# Patient Record
Sex: FEMALE | Race: WHITE | Employment: FULL TIME | URBAN - METROPOLITAN AREA
[De-identification: names, ages, dates, MRNs, and addresses within clinical notes are randomized per-mention and may not be internally consistent; named-entity substitution may affect disease eponyms.]

---

## 2018-04-28 LAB
CHOLESTEROL, TOTAL: 240 MG/DL
CHOLESTEROL/HDL RATIO: 2.9
CREATININE: 1 MG/DL
HDLC SERPL-MCNC: 84 MG/DL (ref 35–70)
LDL CHOLESTEROL CALCULATED: 140 MG/DL (ref 0–160)
POTASSIUM (K+): 3.7
TRIGL SERPL-MCNC: 70 MG/DL
VLDLC SERPL CALC-MCNC: ABNORMAL MG/DL

## 2019-04-03 VITALS
DIASTOLIC BLOOD PRESSURE: 68 MMHG | WEIGHT: 137 LBS | SYSTOLIC BLOOD PRESSURE: 118 MMHG | BODY MASS INDEX: 25.86 KG/M2 | HEIGHT: 61 IN | TEMPERATURE: 98.2 F

## 2019-04-03 RX ORDER — DICYCLOMINE HYDROCHLORIDE 10 MG/1
1 CAPSULE ORAL 3 TIMES DAILY
COMMUNITY
Start: 2018-05-15 | End: 2019-06-19

## 2019-04-03 RX ORDER — ONDANSETRON 4 MG/1
1 TABLET, FILM COATED ORAL EVERY 6 HOURS
COMMUNITY
Start: 2018-05-15 | End: 2019-06-19

## 2019-04-03 RX ORDER — TRIAMTERENE AND HYDROCHLOROTHIAZIDE 37.5; 25 MG/1; MG/1
1 TABLET ORAL DAILY
COMMUNITY
Start: 2013-09-30 | End: 2019-05-08 | Stop reason: SDUPTHER

## 2019-04-03 RX ORDER — ACYCLOVIR 50 MG/G
CREAM TOPICAL DAILY
COMMUNITY
Start: 2009-01-12 | End: 2019-06-19

## 2019-04-03 RX ORDER — CITALOPRAM 20 MG/1
1 TABLET ORAL DAILY
COMMUNITY
Start: 2016-12-22 | End: 2019-05-08 | Stop reason: SDUPTHER

## 2019-05-08 RX ORDER — TRIAMTERENE AND HYDROCHLOROTHIAZIDE 37.5; 25 MG/1; MG/1
1 TABLET ORAL DAILY
Qty: 90 TABLET | Refills: 1 | Status: SHIPPED | OUTPATIENT
Start: 2019-05-08 | End: 2019-06-19 | Stop reason: SDUPTHER

## 2019-05-08 RX ORDER — BUPROPION HYDROCHLORIDE 300 MG/1
300 TABLET ORAL EVERY MORNING
Qty: 90 TABLET | Refills: 1 | Status: SHIPPED | OUTPATIENT
Start: 2019-05-08 | End: 2019-06-19

## 2019-05-08 RX ORDER — CITALOPRAM 20 MG/1
20 TABLET ORAL DAILY
Qty: 90 TABLET | Refills: 1 | Status: SHIPPED | OUTPATIENT
Start: 2019-05-08 | End: 2019-06-19 | Stop reason: SDUPTHER

## 2019-06-10 ENCOUNTER — HOSPITAL ENCOUNTER (OUTPATIENT)
Age: 64
Discharge: HOME | End: 2019-06-12
Payer: COMMERCIAL

## 2019-06-10 LAB
ALBUMIN SERPL-MCNC: 4.2 G/DL (ref 3.5–5.2)
ALP BLD-CCNC: 90 U/L (ref 35–104)
ALT SERPL-CCNC: 22 U/L (ref 0–32)
ANION GAP SERPL CALCULATED.3IONS-SCNC: 16 MMOL/L (ref 7–16)
AST SERPL-CCNC: 18 U/L (ref 0–31)
BASOPHILS ABSOLUTE: 0.05 E9/L (ref 0–0.2)
BASOPHILS RELATIVE PERCENT: 1 % (ref 0–2)
BILIRUB SERPL-MCNC: 0.7 MG/DL (ref 0–1.2)
BUN BLDV-MCNC: 17 MG/DL (ref 8–23)
CALCIUM SERPL-MCNC: 9.3 MG/DL (ref 8.6–10.2)
CHLORIDE BLD-SCNC: 104 MMOL/L (ref 98–107)
CHOLESTEROL, TOTAL: 242 MG/DL (ref 0–199)
CO2: 24 MMOL/L (ref 22–29)
CREAT SERPL-MCNC: 1.1 MG/DL (ref 0.5–1)
EOSINOPHILS ABSOLUTE: 0.22 E9/L (ref 0.05–0.5)
EOSINOPHILS RELATIVE PERCENT: 4.4 % (ref 0–6)
GFR AFRICAN AMERICAN: >60
GFR NON-AFRICAN AMERICAN: 50 ML/MIN/1.73
GLUCOSE BLD-MCNC: 99 MG/DL (ref 74–99)
HCT VFR BLD CALC: 39.5 % (ref 34–48)
HDLC SERPL-MCNC: 69 MG/DL
HEMOGLOBIN: 12.9 G/DL (ref 11.5–15.5)
IMMATURE GRANULOCYTES #: 0.03 E9/L
IMMATURE GRANULOCYTES %: 0.6 % (ref 0–5)
LDL CHOLESTEROL CALCULATED: 157 MG/DL (ref 0–99)
LYMPHOCYTES ABSOLUTE: 1.3 E9/L (ref 1.5–4)
LYMPHOCYTES RELATIVE PERCENT: 26.1 % (ref 20–42)
MCH RBC QN AUTO: 29.6 PG (ref 26–35)
MCHC RBC AUTO-ENTMCNC: 32.7 % (ref 32–34.5)
MCV RBC AUTO: 90.6 FL (ref 80–99.9)
MONOCYTES ABSOLUTE: 0.41 E9/L (ref 0.1–0.95)
MONOCYTES RELATIVE PERCENT: 8.2 % (ref 2–12)
NEUTROPHILS ABSOLUTE: 2.97 E9/L (ref 1.8–7.3)
NEUTROPHILS RELATIVE PERCENT: 59.7 % (ref 43–80)
PDW BLD-RTO: 13.4 FL (ref 11.5–15)
PLATELET # BLD: 313 E9/L (ref 130–450)
PMV BLD AUTO: 10.6 FL (ref 7–12)
POTASSIUM SERPL-SCNC: 4.1 MMOL/L (ref 3.5–5)
RBC # BLD: 4.36 E12/L (ref 3.5–5.5)
SODIUM BLD-SCNC: 144 MMOL/L (ref 132–146)
T4 TOTAL: 6.3 MCG/DL (ref 4.5–11.7)
TOTAL PROTEIN: 6.8 G/DL (ref 6.4–8.3)
TRIGL SERPL-MCNC: 79 MG/DL (ref 0–149)
TSH SERPL DL<=0.05 MIU/L-ACNC: 2.71 UIU/ML (ref 0.27–4.2)
VLDLC SERPL CALC-MCNC: 16 MG/DL
WBC # BLD: 5 E9/L (ref 4.5–11.5)

## 2019-06-10 PROCEDURE — 80053 COMPREHEN METABOLIC PANEL: CPT

## 2019-06-10 PROCEDURE — 84443 ASSAY THYROID STIM HORMONE: CPT

## 2019-06-10 PROCEDURE — 84436 ASSAY OF TOTAL THYROXINE: CPT

## 2019-06-10 PROCEDURE — 80061 LIPID PANEL: CPT

## 2019-06-10 PROCEDURE — 36415 COLL VENOUS BLD VENIPUNCTURE: CPT

## 2019-06-10 PROCEDURE — 85025 COMPLETE CBC W/AUTO DIFF WBC: CPT

## 2019-06-19 ENCOUNTER — OFFICE VISIT (OUTPATIENT)
Dept: PRIMARY CARE CLINIC | Age: 64
End: 2019-06-19
Payer: COMMERCIAL

## 2019-06-19 VITALS
BODY MASS INDEX: 27.88 KG/M2 | HEIGHT: 60 IN | TEMPERATURE: 98.4 F | DIASTOLIC BLOOD PRESSURE: 82 MMHG | WEIGHT: 142 LBS | SYSTOLIC BLOOD PRESSURE: 132 MMHG

## 2019-06-19 DIAGNOSIS — F98.8 ATTENTION DEFICIT DISORDER (ADD) IN ADULT: ICD-10-CM

## 2019-06-19 DIAGNOSIS — E78.2 MIXED HYPERLIPIDEMIA: ICD-10-CM

## 2019-06-19 DIAGNOSIS — Z00.01 ENCOUNTER FOR ANNUAL GENERAL MEDICAL EXAMINATION WITH ABNORMAL FINDINGS IN ADULT: Primary | ICD-10-CM

## 2019-06-19 DIAGNOSIS — F41.9 ANXIETY: ICD-10-CM

## 2019-06-19 DIAGNOSIS — I10 ESSENTIAL HYPERTENSION: ICD-10-CM

## 2019-06-19 PROCEDURE — 99396 PREV VISIT EST AGE 40-64: CPT | Performed by: FAMILY MEDICINE

## 2019-06-19 RX ORDER — ATOMOXETINE 18 MG/1
18 CAPSULE ORAL DAILY
Qty: 30 CAPSULE | Refills: 1 | Status: SHIPPED | OUTPATIENT
Start: 2019-06-19 | End: 2019-10-09

## 2019-06-19 RX ORDER — ATOMOXETINE 18 MG/1
18 CAPSULE ORAL DAILY
Qty: 30 CAPSULE | Refills: 1 | Status: SHIPPED | OUTPATIENT
Start: 2019-06-19 | End: 2019-06-19

## 2019-06-19 RX ORDER — CITALOPRAM 20 MG/1
20 TABLET ORAL DAILY
Qty: 90 TABLET | Refills: 5 | Status: SHIPPED | OUTPATIENT
Start: 2019-06-19 | End: 2019-11-12 | Stop reason: SDUPTHER

## 2019-06-19 RX ORDER — TRIAMTERENE AND HYDROCHLOROTHIAZIDE 37.5; 25 MG/1; MG/1
1 TABLET ORAL DAILY
Qty: 90 TABLET | Refills: 5 | Status: SHIPPED | OUTPATIENT
Start: 2019-06-19 | End: 2019-11-12 | Stop reason: SDUPTHER

## 2019-06-19 ASSESSMENT — ENCOUNTER SYMPTOMS
GASTROINTESTINAL NEGATIVE: 1
RESPIRATORY NEGATIVE: 1
EYES NEGATIVE: 1
ALLERGIC/IMMUNOLOGIC NEGATIVE: 1

## 2019-06-19 NOTE — PROGRESS NOTES
19  Name: Chris Grajeda    : 1955    Sex: female    Age: 59 y.o. Subjective:  Chief Complaint: Patient is here for wellness visit   For lab       Feels ok  No  Cp or sob    Lab ntoed  Chol  240----to  242  Ros neg      She  Wants to try  Again the  strattera  Too   Past with  Dr Giraldo Gravely          Review of Systems   Constitutional: Negative. HENT: Negative. Eyes: Negative. Respiratory: Negative. Cardiovascular: Negative. Gastrointestinal: Negative. Endocrine: Negative. Genitourinary: Negative. Musculoskeletal: Negative. Skin: Negative. Allergic/Immunologic: Negative. Neurological: Negative. Hematological: Negative. Psychiatric/Behavioral: Negative.           Current Outpatient Medications:     citalopram (CELEXA) 20 MG tablet, Take 1 tablet by mouth daily, Disp: 90 tablet, Rfl: 5    triamterene-hydrochlorothiazide (MAXZIDE-25) 37.5-25 MG per tablet, Take 1 tablet by mouth daily, Disp: 90 tablet, Rfl: 5    atomoxetine (STRATTERA) 18 MG capsule, Take 1 capsule by mouth daily, Disp: 30 capsule, Rfl: 1  No Known Allergies  Social History     Socioeconomic History    Marital status:      Spouse name: Not on file    Number of children: Not on file    Years of education: Not on file    Highest education level: Not on file   Occupational History    Not on file   Social Needs    Financial resource strain: Not on file    Food insecurity:     Worry: Not on file     Inability: Not on file    Transportation needs:     Medical: Not on file     Non-medical: Not on file   Tobacco Use    Smoking status: Former Smoker     Types: Cigarettes    Smokeless tobacco: Never Used    Tobacco comment: QUIT AGE 38   Substance and Sexual Activity    Alcohol use: Not on file    Drug use: Not on file    Sexual activity: Not on file   Lifestyle    Physical activity:     Days per week: Not on file     Minutes per session: Not on file    Stress: Not on file Relationships    Social connections:     Talks on phone: Not on file     Gets together: Not on file     Attends Gnosticism service: Not on file     Active member of club or organization: Not on file     Attends meetings of clubs or organizations: Not on file     Relationship status: Not on file    Intimate partner violence:     Fear of current or ex partner: Not on file     Emotionally abused: Not on file     Physically abused: Not on file     Forced sexual activity: Not on file   Other Topics Concern    Not on file   Social History Narrative        Bone Density Scan - (2009)    DEPRESSION    HYPERLIPIDEMIA    DIV---ENGAGED---    TWO KIDS----GIRLS    AT [facility]    FOCUS ISSUE    ELEV BP    QUIT SMOKING AGE 38    SIS AGE 62 WITH INTRACRANIAL BLEED----FINE    THREE OTHER SISTERS FINE ALSO    ABNORMAL BASELINE EKG    TMT 2-13    MOM  QUICKLY AT 89 9-13 WITH BAD HEART VALVE    DAD  LUNG CA----AGE 68    Minerva Bees  1955 Page #2    HTN 9-    INSOMNIA    DEPRESSION 9----AFTER MOM     FOOT PAIN DR HERNANDEZ 10 -13    LEFT TOTAL KNEE SURGERY DR Rita Lozano 7-15    REFUSES COLONOSCOPY 8-15    LEFT ABDOMEN SHINGLES 12-16 AGAIN 5-18    DAUGHTER UBALDO---NOT TALK---TWO KIDS    ARINA---ALL OK---DATES LULÚ WHO OWNS MR GRZEGORZ CASTAÑEDA 12 YRS OLDER RETAIL    Add  With dr Umm Peterson and rx with    Alessia Prieto   Due to elev bp      Past Medical History:   Diagnosis Date    History of shingles      AND 2018    Hyperlipidemia     Hypertension      No family history on file. Past Surgical History:   Procedure Laterality Date    KNEE SURGERY Left     TOTAL LEFT KNEE DR Rita Lozano      Vitals:    19 0946   BP: 132/82   Temp: 98.4 °F (36.9 °C)   Weight: 142 lb (64.4 kg)   Height: 5' (1.524 m)       Objective:    Physical Exam   Constitutional: She is oriented to person, place, and time. She appears well-developed and well-nourished. HENT:   Head: Normocephalic.    Eyes: Pupils are equal, JAK Owens DO

## 2019-10-09 ENCOUNTER — OFFICE VISIT (OUTPATIENT)
Dept: FAMILY MEDICINE CLINIC | Age: 64
End: 2019-10-09
Payer: COMMERCIAL

## 2019-10-09 VITALS
SYSTOLIC BLOOD PRESSURE: 128 MMHG | HEIGHT: 60 IN | DIASTOLIC BLOOD PRESSURE: 78 MMHG | TEMPERATURE: 97.9 F | BODY MASS INDEX: 28.66 KG/M2 | WEIGHT: 146 LBS

## 2019-10-09 DIAGNOSIS — S39.012A STRAIN OF LUMBAR REGION, INITIAL ENCOUNTER: ICD-10-CM

## 2019-10-09 DIAGNOSIS — M53.3 SACRO ILIAL PAIN: Primary | ICD-10-CM

## 2019-10-09 PROCEDURE — 96372 THER/PROPH/DIAG INJ SC/IM: CPT | Performed by: FAMILY MEDICINE

## 2019-10-09 PROCEDURE — 99213 OFFICE O/P EST LOW 20 MIN: CPT | Performed by: FAMILY MEDICINE

## 2019-10-09 RX ORDER — BACLOFEN 10 MG/1
10 TABLET ORAL 3 TIMES DAILY
Qty: 30 TABLET | Refills: 0 | Status: SHIPPED
Start: 2019-10-09 | End: 2022-01-06

## 2019-10-09 RX ORDER — BUPROPION HYDROCHLORIDE 300 MG/1
TABLET ORAL
Refills: 1 | COMMUNITY
Start: 2019-08-06 | End: 2019-11-12 | Stop reason: SDUPTHER

## 2019-10-09 RX ORDER — METHYLPREDNISOLONE 4 MG/1
TABLET ORAL
Qty: 1 KIT | Refills: 0 | Status: SHIPPED
Start: 2019-10-09 | End: 2022-01-06

## 2019-10-09 RX ORDER — CYCLOSPORINE 0.5 MG/ML
EMULSION OPHTHALMIC
Refills: 11 | COMMUNITY
Start: 2019-08-02

## 2019-10-09 RX ORDER — METHYLPREDNISOLONE ACETATE 40 MG/ML
40 INJECTION, SUSPENSION INTRA-ARTICULAR; INTRALESIONAL; INTRAMUSCULAR; SOFT TISSUE ONCE
Status: COMPLETED | OUTPATIENT
Start: 2019-10-09 | End: 2019-10-09

## 2019-10-09 RX ORDER — ACYCLOVIR 50 MG/G
CREAM TOPICAL
COMMUNITY
Start: 2009-01-12

## 2019-10-09 RX ADMIN — METHYLPREDNISOLONE ACETATE 40 MG: 40 INJECTION, SUSPENSION INTRA-ARTICULAR; INTRALESIONAL; INTRAMUSCULAR; SOFT TISSUE at 10:52

## 2019-10-09 ASSESSMENT — ENCOUNTER SYMPTOMS
BACK PAIN: 1
RESPIRATORY NEGATIVE: 1

## 2019-10-09 ASSESSMENT — PATIENT HEALTH QUESTIONNAIRE - PHQ9
SUM OF ALL RESPONSES TO PHQ QUESTIONS 1-9: 0
SUM OF ALL RESPONSES TO PHQ QUESTIONS 1-9: 0
SUM OF ALL RESPONSES TO PHQ9 QUESTIONS 1 & 2: 0
2. FEELING DOWN, DEPRESSED OR HOPELESS: 0
1. LITTLE INTEREST OR PLEASURE IN DOING THINGS: 0

## 2019-11-12 DIAGNOSIS — I10 ESSENTIAL HYPERTENSION: ICD-10-CM

## 2019-11-12 DIAGNOSIS — F41.9 ANXIETY: ICD-10-CM

## 2019-11-12 RX ORDER — TRIAMTERENE AND HYDROCHLOROTHIAZIDE 37.5; 25 MG/1; MG/1
1 TABLET ORAL DAILY
Qty: 90 TABLET | Refills: 5 | Status: SHIPPED
Start: 2019-11-12 | End: 2021-01-04 | Stop reason: SDUPTHER

## 2019-11-12 RX ORDER — BUPROPION HYDROCHLORIDE 300 MG/1
TABLET ORAL
Qty: 90 TABLET | Refills: 5 | Status: SHIPPED
Start: 2019-11-12 | End: 2021-01-04 | Stop reason: SDUPTHER

## 2019-11-12 RX ORDER — CITALOPRAM 20 MG/1
20 TABLET ORAL DAILY
Qty: 90 TABLET | Refills: 5 | Status: SHIPPED
Start: 2019-11-12 | End: 2021-01-04 | Stop reason: SDUPTHER

## 2020-05-27 ENCOUNTER — TELEPHONE (OUTPATIENT)
Dept: PRIMARY CARE CLINIC | Age: 65
End: 2020-05-27

## 2020-05-28 RX ORDER — FLUCONAZOLE 150 MG/1
150 TABLET ORAL
Qty: 2 TABLET | Refills: 1 | Status: SHIPPED
Start: 2020-05-28 | End: 2022-01-06

## 2021-01-04 DIAGNOSIS — I10 ESSENTIAL HYPERTENSION: ICD-10-CM

## 2021-01-04 DIAGNOSIS — F41.9 ANXIETY: ICD-10-CM

## 2021-01-04 RX ORDER — BUPROPION HYDROCHLORIDE 300 MG/1
TABLET ORAL
Qty: 90 TABLET | Refills: 3 | Status: SHIPPED
Start: 2021-01-04 | End: 2022-01-06 | Stop reason: SDUPTHER

## 2021-01-04 RX ORDER — TRIAMTERENE AND HYDROCHLOROTHIAZIDE 37.5; 25 MG/1; MG/1
1 TABLET ORAL DAILY
Qty: 90 TABLET | Refills: 3 | Status: SHIPPED
Start: 2021-01-04 | End: 2022-01-06 | Stop reason: SDUPTHER

## 2021-01-04 RX ORDER — CITALOPRAM 20 MG/1
20 TABLET ORAL DAILY
Qty: 90 TABLET | Refills: 3 | Status: SHIPPED
Start: 2021-01-04 | End: 2022-01-06 | Stop reason: SDUPTHER

## 2022-01-06 ENCOUNTER — OFFICE VISIT (OUTPATIENT)
Dept: PRIMARY CARE CLINIC | Age: 67
End: 2022-01-06
Payer: COMMERCIAL

## 2022-01-06 DIAGNOSIS — M15.9 PRIMARY OSTEOARTHRITIS INVOLVING MULTIPLE JOINTS: ICD-10-CM

## 2022-01-06 DIAGNOSIS — F41.9 ANXIETY: ICD-10-CM

## 2022-01-06 DIAGNOSIS — E78.2 MIXED HYPERLIPIDEMIA: Primary | ICD-10-CM

## 2022-01-06 DIAGNOSIS — I10 ESSENTIAL HYPERTENSION: ICD-10-CM

## 2022-01-06 DIAGNOSIS — D51.8 VITAMIN B12 DEFICIENCY (DIETARY) ANEMIA: ICD-10-CM

## 2022-01-06 DIAGNOSIS — M81.0 AGE-RELATED OSTEOPOROSIS WITHOUT CURRENT PATHOLOGICAL FRACTURE: ICD-10-CM

## 2022-01-06 DIAGNOSIS — E03.9 ACQUIRED HYPOTHYROIDISM: ICD-10-CM

## 2022-01-06 PROCEDURE — G8484 FLU IMMUNIZE NO ADMIN: HCPCS | Performed by: FAMILY MEDICINE

## 2022-01-06 PROCEDURE — G8400 PT W/DXA NO RESULTS DOC: HCPCS | Performed by: FAMILY MEDICINE

## 2022-01-06 PROCEDURE — G8419 CALC BMI OUT NRM PARAM NOF/U: HCPCS | Performed by: FAMILY MEDICINE

## 2022-01-06 PROCEDURE — 99214 OFFICE O/P EST MOD 30 MIN: CPT | Performed by: FAMILY MEDICINE

## 2022-01-06 PROCEDURE — 3017F COLORECTAL CA SCREEN DOC REV: CPT | Performed by: FAMILY MEDICINE

## 2022-01-06 PROCEDURE — 1123F ACP DISCUSS/DSCN MKR DOCD: CPT | Performed by: FAMILY MEDICINE

## 2022-01-06 PROCEDURE — 1036F TOBACCO NON-USER: CPT | Performed by: FAMILY MEDICINE

## 2022-01-06 PROCEDURE — 1090F PRES/ABSN URINE INCON ASSESS: CPT | Performed by: FAMILY MEDICINE

## 2022-01-06 PROCEDURE — G8427 DOCREV CUR MEDS BY ELIG CLIN: HCPCS | Performed by: FAMILY MEDICINE

## 2022-01-06 PROCEDURE — 4040F PNEUMOC VAC/ADMIN/RCVD: CPT | Performed by: FAMILY MEDICINE

## 2022-01-06 RX ORDER — BUPROPION HYDROCHLORIDE 300 MG/1
TABLET ORAL
Qty: 90 TABLET | Refills: 3 | Status: SHIPPED | OUTPATIENT
Start: 2022-01-06

## 2022-01-06 RX ORDER — CITALOPRAM 20 MG/1
20 TABLET ORAL DAILY
Qty: 90 TABLET | Refills: 3 | Status: SHIPPED
Start: 2022-01-06 | End: 2022-10-21 | Stop reason: SDUPTHER

## 2022-01-06 RX ORDER — TRIAMTERENE AND HYDROCHLOROTHIAZIDE 37.5; 25 MG/1; MG/1
1 TABLET ORAL DAILY
Qty: 90 TABLET | Refills: 3 | Status: SHIPPED | OUTPATIENT
Start: 2022-01-06

## 2022-01-06 NOTE — PROGRESS NOTES
22  Name: Yadi Treviño    : 1955    Sex: female    Age: 73 y.o. Subjective:  Chief Complaint: Patient is here for yearly check    Lumbar no chest pain or shortness of breath felt follow-up last year . Great deal stress. Her brother-in-law passed away. Her fiancé had a stroke and her sister developed severe lymphoma but survived. When she feels well. Her last cholesterol in 2019 was over 240. I told her we need to address this she was reluctant to before but willing now. Review of Systems   Constitutional: Negative. HENT: Negative. Eyes: Negative. Respiratory: Negative. Cardiovascular: Negative. Gastrointestinal: Negative. Endocrine: Negative. Genitourinary: Negative. Musculoskeletal: Negative. Skin: Negative. Allergic/Immunologic: Negative. Neurological: Negative. Hematological: Negative. Psychiatric/Behavioral: Negative.           Current Outpatient Medications:     triamterene-hydroCHLOROthiazide (MAXZIDE-25) 37.5-25 MG per tablet, Take 1 tablet by mouth daily, Disp: 90 tablet, Rfl: 3    buPROPion (WELLBUTRIN XL) 300 MG extended release tablet, TAKE 1 TABLET BY MOUTH EVERY DAY IN THE MORNING, Disp: 90 tablet, Rfl: 3    citalopram (CELEXA) 20 MG tablet, Take 1 tablet by mouth daily, Disp: 90 tablet, Rfl: 3    RESTASIS 0.05 % ophthalmic emulsion, INSTILL 1 DROP INTO BOTH EYES EVERY 12 HOURS, Disp: , Rfl: 11    acyclovir (ZOVIRAX) 5 % CREA, , Disp: , Rfl:   No Known Allergies  Social History     Socioeconomic History    Marital status:      Spouse name: Not on file    Number of children: Not on file    Years of education: Not on file    Highest education level: Not on file   Occupational History    Not on file   Tobacco Use    Smoking status: Former Smoker     Types: Cigarettes    Smokeless tobacco: Never Used    Tobacco comment: QUIT AGE 44   Vaping Use    Vaping Use: Never used   Substance and Sexual Activity    Alcohol use: Not on file    Drug use: Not on file    Sexual activity: Not on file   Other Topics Concern    Not on file   Social History Narrative        Bone Density Scan - (2009)    DEPRESSION    HYPERLIPIDEMIA    DIV---ENGAGED---    TWO KIDS----GIRLS------two grands    [facility]    FOCUS ISSUE    ELEV BP    QUIT SMOKING AGE 38    SIS AGE 62 WITH INTRACRANIAL BLEED----FINE    THREE OTHER SISTERS FINE ALSO    ABNORMAL BASELINE EKG    TMT 2-13    MOM  QUICKLY AT 89 9-13 WITH BAD HEART VALVE    DAD  LUNG CA----AGE 68    [employer] Bees  1955 Page #2    HTN 9-    INSOMNIA    DEPRESSION ---AFTER MOM     FOOT PAIN DR HERNANDEZ 10 -    LEFT TOTAL KNEE SURGERY DR Audrey Alexander 7-15    REFUSES COLONOSCOPY -15    LEFT ABDOMEN SHINGLES -16 AGAIN 19    DAUGHTER UBALDO---NOT TALK---TWO KIDS    ARINA---ALL OK---DATES LULÚ WHO OWNS MR [employer]ZE[employer]Z CASTAÑEDA 12 YRS OLDER RETAIL    Add  With dr Tina Bianchi and rx with    Andrey Edmonds  And  Bharat   Due to elev bp    Sister diagnosed with lymphoma  at age 76years old and survived    Brother-in-law  at 61years old from motor cycle accident     Fiancé had a stroke  and 68years old with residual symptoms     Social Determinants of Health     Financial Resource Strain:     Difficulty of Paying Living Expenses: Not on file   Food Insecurity:     Worried About Running Out of Food in the Last Year: Not on file    Leora of Food in the Last Year: Not on file   Transportation Needs:     Lack of Transportation (Medical): Not on file    Lack of Transportation (Non-Medical):  Not on file   Physical Activity:     Days of Exercise per Week: Not on file    Minutes of Exercise per Session: Not on file   Stress:     Feeling of Stress : Not on file   Social Connections:     Frequency of Communication with Friends and Family: Not on file    Frequency of Social Gatherings with Friends and Family: Not on file    Attends Taoist Services: Not on file  Active Member of Clubs or Organizations: Not on file    Attends Club or Organization Meetings: Not on file    Marital Status: Not on file   Intimate Partner Violence:     Fear of Current or Ex-Partner: Not on file    Emotionally Abused: Not on file    Physically Abused: Not on file    Sexually Abused: Not on file   Housing Stability:     Unable to Pay for Housing in the Last Year: Not on file    Number of [place] in the Last Year: Not on file    Unstable Housing in the Last Year: Not on file      Past Medical History:   Diagnosis Date    History of shingles     2016 AND 2018    Hyperlipidemia     Hypertension      No family history on file. Past Surgical History:   Procedure Laterality Date    KNEE SURGERY Left 2015    TOTAL LEFT KNEE DR Sha Kennedy      Vitals:    01/06/22 1646   BP: 130/82   Temp: 96.7 °F (35.9 °C)   TempSrc: Infrared   Weight: 148 lb (67.1 kg)       Objective:    Physical Exam  Vitals reviewed. Constitutional:       Appearance: She is well-developed. HENT:      Head: Normocephalic. Eyes:      Pupils: Pupils are equal, round, and reactive to light. Cardiovascular:      Rate and Rhythm: Normal rate and regular rhythm. Pulmonary:      Effort: Pulmonary effort is normal.      Breath sounds: Normal breath sounds. Abdominal:      Palpations: Abdomen is soft. Musculoskeletal:         General: Normal range of motion. Cervical back: Normal range of motion. Skin:     General: Skin is warm. Neurological:      Mental Status: She is alert and oriented to person, place, and time. Psychiatric:         Behavior: Behavior normal.         Jaja Soliman was seen today for medication refill. Diagnoses and all orders for this visit:    Mixed hyperlipidemia    Essential hypertension  -     triamterene-hydroCHLOROthiazide (MAXZIDE-25) 37.5-25 MG per tablet;  Take 1 tablet by mouth daily    Anxiety  -     buPROPion (WELLBUTRIN XL) 300 MG extended release tablet; TAKE 1 TABLET BY

## 2022-01-07 VITALS
DIASTOLIC BLOOD PRESSURE: 82 MMHG | WEIGHT: 148 LBS | BODY MASS INDEX: 28.9 KG/M2 | SYSTOLIC BLOOD PRESSURE: 130 MMHG | TEMPERATURE: 96.7 F

## 2022-01-07 PROBLEM — M15.0 PRIMARY OSTEOARTHRITIS INVOLVING MULTIPLE JOINTS: Status: ACTIVE | Noted: 2022-01-07

## 2022-01-07 PROBLEM — F41.9 ANXIETY: Chronic | Status: ACTIVE | Noted: 2022-01-07

## 2022-01-07 PROBLEM — E78.2 MIXED HYPERLIPIDEMIA: Chronic | Status: ACTIVE | Noted: 2019-06-19

## 2022-01-07 PROBLEM — M15.9 PRIMARY OSTEOARTHRITIS INVOLVING MULTIPLE JOINTS: Status: ACTIVE | Noted: 2022-01-07

## 2022-01-07 PROBLEM — F41.9 ANXIETY: Status: ACTIVE | Noted: 2022-01-07

## 2022-01-07 PROBLEM — I10 ESSENTIAL HYPERTENSION: Chronic | Status: ACTIVE | Noted: 2019-06-19

## 2022-01-07 PROBLEM — M15.0 PRIMARY OSTEOARTHRITIS INVOLVING MULTIPLE JOINTS: Chronic | Status: ACTIVE | Noted: 2022-01-07

## 2022-01-07 PROBLEM — M15.9 PRIMARY OSTEOARTHRITIS INVOLVING MULTIPLE JOINTS: Chronic | Status: ACTIVE | Noted: 2022-01-07

## 2022-01-07 ASSESSMENT — PATIENT HEALTH QUESTIONNAIRE - PHQ9
1. LITTLE INTEREST OR PLEASURE IN DOING THINGS: 0
SUM OF ALL RESPONSES TO PHQ QUESTIONS 1-9: 0
SUM OF ALL RESPONSES TO PHQ QUESTIONS 1-9: 0
2. FEELING DOWN, DEPRESSED OR HOPELESS: 0
SUM OF ALL RESPONSES TO PHQ QUESTIONS 1-9: 0
SUM OF ALL RESPONSES TO PHQ QUESTIONS 1-9: 0
SUM OF ALL RESPONSES TO PHQ9 QUESTIONS 1 & 2: 0

## 2022-01-13 DIAGNOSIS — E03.9 ACQUIRED HYPOTHYROIDISM: ICD-10-CM

## 2022-01-13 DIAGNOSIS — D51.8 VITAMIN B12 DEFICIENCY (DIETARY) ANEMIA: ICD-10-CM

## 2022-01-13 DIAGNOSIS — E78.2 MIXED HYPERLIPIDEMIA: ICD-10-CM

## 2022-01-13 DIAGNOSIS — M81.0 AGE-RELATED OSTEOPOROSIS WITHOUT CURRENT PATHOLOGICAL FRACTURE: ICD-10-CM

## 2022-01-13 DIAGNOSIS — I10 ESSENTIAL HYPERTENSION: ICD-10-CM

## 2022-01-13 LAB
ALBUMIN SERPL-MCNC: 4.4 G/DL (ref 3.5–5.2)
ALP BLD-CCNC: 86 U/L (ref 35–104)
ALT SERPL-CCNC: 20 U/L (ref 0–32)
ANION GAP SERPL CALCULATED.3IONS-SCNC: 15 MMOL/L (ref 7–16)
AST SERPL-CCNC: 20 U/L (ref 0–31)
BACTERIA: ABNORMAL /HPF
BASOPHILS ABSOLUTE: 0.04 E9/L (ref 0–0.2)
BASOPHILS RELATIVE PERCENT: 0.9 % (ref 0–2)
BILIRUB SERPL-MCNC: 0.8 MG/DL (ref 0–1.2)
BILIRUBIN URINE: NEGATIVE
BLOOD, URINE: NEGATIVE
BUN BLDV-MCNC: 22 MG/DL (ref 6–23)
CALCIUM SERPL-MCNC: 9.7 MG/DL (ref 8.6–10.2)
CHLORIDE BLD-SCNC: 104 MMOL/L (ref 98–107)
CHOLESTEROL, TOTAL: 254 MG/DL (ref 0–199)
CLARITY: CLEAR
CO2: 23 MMOL/L (ref 22–29)
COLOR: YELLOW
CREAT SERPL-MCNC: 1.1 MG/DL (ref 0.5–1)
EOSINOPHILS ABSOLUTE: 0.19 E9/L (ref 0.05–0.5)
EOSINOPHILS RELATIVE PERCENT: 4.2 % (ref 0–6)
GFR AFRICAN AMERICAN: >60
GFR NON-AFRICAN AMERICAN: 50 ML/MIN/1.73
GLUCOSE BLD-MCNC: 98 MG/DL (ref 74–99)
GLUCOSE URINE: NEGATIVE MG/DL
HCT VFR BLD CALC: 40.5 % (ref 34–48)
HDLC SERPL-MCNC: 68 MG/DL
HEMOGLOBIN: 13.3 G/DL (ref 11.5–15.5)
IMMATURE GRANULOCYTES #: 0.01 E9/L
IMMATURE GRANULOCYTES %: 0.2 % (ref 0–5)
KETONES, URINE: NEGATIVE MG/DL
LDL CHOLESTEROL CALCULATED: 165 MG/DL (ref 0–99)
LEUKOCYTE ESTERASE, URINE: ABNORMAL
LYMPHOCYTES ABSOLUTE: 1.12 E9/L (ref 1.5–4)
LYMPHOCYTES RELATIVE PERCENT: 24.9 % (ref 20–42)
MCH RBC QN AUTO: 29.2 PG (ref 26–35)
MCHC RBC AUTO-ENTMCNC: 32.8 % (ref 32–34.5)
MCV RBC AUTO: 89 FL (ref 80–99.9)
MONOCYTES ABSOLUTE: 0.39 E9/L (ref 0.1–0.95)
MONOCYTES RELATIVE PERCENT: 8.7 % (ref 2–12)
NEUTROPHILS ABSOLUTE: 2.74 E9/L (ref 1.8–7.3)
NEUTROPHILS RELATIVE PERCENT: 61.1 % (ref 43–80)
NITRITE, URINE: NEGATIVE
PDW BLD-RTO: 13.5 FL (ref 11.5–15)
PH UA: 6 (ref 5–9)
PLATELET # BLD: 297 E9/L (ref 130–450)
PMV BLD AUTO: 10.6 FL (ref 7–12)
POTASSIUM SERPL-SCNC: 4.4 MMOL/L (ref 3.5–5)
PROTEIN UA: NEGATIVE MG/DL
RBC # BLD: 4.55 E12/L (ref 3.5–5.5)
RBC UA: ABNORMAL /HPF (ref 0–2)
SODIUM BLD-SCNC: 142 MMOL/L (ref 132–146)
SPECIFIC GRAVITY UA: 1.02 (ref 1–1.03)
T4 TOTAL: 6.2 MCG/DL (ref 4.5–11.7)
TOTAL PROTEIN: 6.9 G/DL (ref 6.4–8.3)
TRIGL SERPL-MCNC: 105 MG/DL (ref 0–149)
TSH SERPL DL<=0.05 MIU/L-ACNC: 2.46 UIU/ML (ref 0.27–4.2)
UROBILINOGEN, URINE: 0.2 E.U./DL
VITAMIN B-12: 434 PG/ML (ref 211–946)
VITAMIN D 25-HYDROXY: 15 NG/ML (ref 30–100)
VLDLC SERPL CALC-MCNC: 21 MG/DL
WBC # BLD: 4.5 E9/L (ref 4.5–11.5)
WBC UA: ABNORMAL /HPF (ref 0–5)

## 2022-01-21 ENCOUNTER — OFFICE VISIT (OUTPATIENT)
Dept: PRIMARY CARE CLINIC | Age: 67
End: 2022-01-21
Payer: COMMERCIAL

## 2022-01-21 VITALS
TEMPERATURE: 97.5 F | BODY MASS INDEX: 28.86 KG/M2 | HEIGHT: 60 IN | SYSTOLIC BLOOD PRESSURE: 128 MMHG | RESPIRATION RATE: 16 BRPM | DIASTOLIC BLOOD PRESSURE: 82 MMHG | WEIGHT: 147 LBS | HEART RATE: 68 BPM | OXYGEN SATURATION: 98 %

## 2022-01-21 DIAGNOSIS — E55.9 VITAMIN D DEFICIENCY: ICD-10-CM

## 2022-01-21 DIAGNOSIS — E78.2 MIXED HYPERLIPIDEMIA: ICD-10-CM

## 2022-01-21 DIAGNOSIS — I10 ESSENTIAL HYPERTENSION: ICD-10-CM

## 2022-01-21 DIAGNOSIS — Z00.00 ROUTINE GENERAL MEDICAL EXAMINATION AT A HEALTH CARE FACILITY: Primary | ICD-10-CM

## 2022-01-21 PROCEDURE — 4040F PNEUMOC VAC/ADMIN/RCVD: CPT | Performed by: FAMILY MEDICINE

## 2022-01-21 PROCEDURE — 1123F ACP DISCUSS/DSCN MKR DOCD: CPT | Performed by: FAMILY MEDICINE

## 2022-01-21 PROCEDURE — G0402 INITIAL PREVENTIVE EXAM: HCPCS | Performed by: FAMILY MEDICINE

## 2022-01-21 PROCEDURE — 3017F COLORECTAL CA SCREEN DOC REV: CPT | Performed by: FAMILY MEDICINE

## 2022-01-21 PROCEDURE — G0403 EKG FOR INITIAL PREVENT EXAM: HCPCS | Performed by: FAMILY MEDICINE

## 2022-01-21 RX ORDER — PRAVASTATIN SODIUM 20 MG
10 TABLET ORAL DAILY
Qty: 30 TABLET | Refills: 5 | Status: SHIPPED
Start: 2022-01-21 | End: 2022-09-27 | Stop reason: SDUPTHER

## 2022-01-21 RX ORDER — ERGOCALCIFEROL 1.25 MG/1
CAPSULE ORAL
Qty: 12 CAPSULE | Refills: 0 | Status: SHIPPED | OUTPATIENT
Start: 2022-01-21

## 2022-01-21 ASSESSMENT — LIFESTYLE VARIABLES
HOW OFTEN DO YOU HAVE A DRINK CONTAINING ALCOHOL: 0
AUDIT TOTAL SCORE: INCOMPLETE
AUDIT-C TOTAL SCORE: INCOMPLETE
HOW OFTEN DO YOU HAVE A DRINK CONTAINING ALCOHOL: NEVER

## 2022-01-21 ASSESSMENT — PATIENT HEALTH QUESTIONNAIRE - PHQ9
SUM OF ALL RESPONSES TO PHQ QUESTIONS 1-9: 0
SUM OF ALL RESPONSES TO PHQ QUESTIONS 1-9: 0
SUM OF ALL RESPONSES TO PHQ9 QUESTIONS 1 & 2: 0
1. LITTLE INTEREST OR PLEASURE IN DOING THINGS: 0
SUM OF ALL RESPONSES TO PHQ QUESTIONS 1-9: 0
2. FEELING DOWN, DEPRESSED OR HOPELESS: 0
SUM OF ALL RESPONSES TO PHQ QUESTIONS 1-9: 0

## 2022-01-21 NOTE — PATIENT INSTRUCTIONS
Personalized Preventive Plan for [facility] - 1/21/2022  Medicare offers a range of preventive health benefits. Some of the tests and screenings are paid in full while other may be subject to a deductible, co-insurance, and/or copay. Some of these benefits include a comprehensive review of your medical history including lifestyle, illnesses that may run in your family, and various assessments and screenings as appropriate. After reviewing your medical record and screening and assessments performed today your provider may have ordered immunizations, labs, imaging, and/or referrals for you. A list of these orders (if applicable) as well as your Preventive Care list are included within your After Visit Summary for your review. Other Preventive Recommendations:    · A preventive eye exam performed by an eye specialist is recommended every 1-2 years to screen for glaucoma; cataracts, macular degeneration, and other eye disorders. · A preventive dental visit is recommended every 6 months. · Try to get at least 150 minutes of exercise per week or 10,000 steps per day on a pedometer . · Order or download the FREE \"Exercise & Physical Activity: Your Everyday Guide\" from [employer]. Call 5-455.718.3356 or search [employer] online. · You need 7533-9999 mg of calcium and 2673-7394 IU of vitamin D per day. It is possible to meet your calcium requirement with diet alone, but a vitamin D supplement is usually necessary to meet this goal.  · When exposed to the sun, use a sunscreen that protects against both UVA and UVB radiation with an SPF of 30 or greater. Reapply every 2 to 3 hours or after sweating, drying off with a towel, or swimming. · Always wear a seat belt when traveling in a car. Always wear a helmet when riding a bicycle or motorcycle.

## 2022-01-21 NOTE — PROGRESS NOTES
Medicare Annual Wellness Visit  Name: Mark Jones Date: 2022   MRN: [id number] Sex: Female   Age: 73 y.o. Ethnicity: Non- / Non    : 1955 Race: White (non-)      Lizz Amezquita is here for [employer]   and also two week  Ck  Re    Wellness    bp  Chol   anx     Vit d    Feel ok      la biwth chol  Up  D low      Screenings for behavioral, psychosocial and functional/safety risks, and cognitive dysfunction are all negative except as indicated below. These results, as well as other patient data from the [address] form, are documented in Flowsheets linked to this Encounter. No Known Allergies      Prior to Visit Medications    Medication Sig Taking? Authorizing Provider   vitamin D (ERGOCALCIFEROL) 1.25 MG (29489 UT) CAPS capsule One weekly for 12 weeks then vit d 3  2000 daily Yes Sebastián Ownes DO   pravastatin (PRAVACHOL) 20 MG tablet Take 0.5 tablets by mouth daily No food Yes Sebastián Owens DO   triamterene-hydroCHLOROthiazide (MAXZIDE-25) 37.5-25 MG per tablet Take 1 tablet by mouth daily  Sebastián Owens DO   buPROPion (WELLBUTRIN XL) 300 MG extended release tablet TAKE 1 TABLET BY MOUTH EVERY DAY IN THE MORNING  Sebastián Owens DO   citalopram (CELEXA) 20 MG tablet Take 1 tablet by mouth daily  Sebastián Owens DO   RESTASIS 0.05 % ophthalmic emulsion INSTILL 1 DROP INTO BOTH EYES EVERY 12 HOURS  Historical Provider, MD   acyclovir (ZOVIRAX) 5 % CREA   Historical Provider, MD         Past Medical History:   Diagnosis Date    History of shingles     2016 AND 2018    Hyperlipidemia     Hypertension        Past Surgical History:   Procedure Laterality Date    KNEE SURGERY Left 2015    TOTAL LEFT KNEE DR Peggy Galindo       No family history on file.     CareTeam (Including outside providers/suppliers regularly involved in providing care):   Patient Care Team:  Yuly Jaquez DO as PCP - General (Family Medicine)  Yuly Jaquez DO as PCP - [facility] Empaneled Provider    Wt Readings from Last 3 Encounters:   01/21/22 147 lb (66.7 kg)   01/06/22 148 lb (67.1 kg)   10/09/19 146 lb (66.2 kg)     Vitals:    01/21/22 1345   BP: 128/82   Pulse: 68   Resp: 16   Temp: 97.5 °F (36.4 °C)   TempSrc: Temporal   SpO2: 98%   Weight: 147 lb (66.7 kg)   Height: 5' (1.524 m)     Body mass index is 28.71 kg/m². Based upon direct observation of the patient, evaluation of cognition reveals recent and remote memory intact. General Appearance: alert and oriented to person, place and time, well developed and well- nourished, in no acute distress  Skin: warm and dry, no rash or erythema  Head: normocephalic and atraumatic  Eyes: pupils equal, round, and reactive to light, extraocular eye movements intact, conjunctivae normal  ENT: tympanic membrane, external ear and ear canal normal bilaterally, nose without deformity, nasal mucosa and turbinates normal without polyps  Neck: supple and non-tender without mass, no thyromegaly or thyroid nodules, no cervical lymphadenopathy  Pulmonary/Chest: clear to auscultation bilaterally- no wheezes, rales or rhonchi, normal air movement, no respiratory distress  Cardiovascular: normal rate, regular rhythm, normal S1 and S2, no murmurs, rubs, clicks, or gallops, distal pulses intact, no carotid bruits  Abdomen: soft, non-tender, non-distended, normal bowel sounds, no masses or organomegaly  Extremities: no cyanosis, clubbing or edema  Musculoskeletal: normal range of motion, no joint swelling, deformity or tenderness  Neurologic: reflexes normal and symmetric, no cranial nerve deficit, gait, coordination and speech normal    Patient's complete Health Risk Assessment and screening values have been reviewed and are found in Flowsheets. The following problems were reviewed today and where indicated follow up appointments were made and/or referrals ordered.       Positive Risk Factor Screenings with Interventions:              General Health and ACP:  General  In general, how would you say your health is?: Good  In the past 7 days, have you experienced any of the following?  New or Increased Pain, New or Increased Fatigue, Loneliness, Social Isolation, Stress or Anger?: None of These  Do you get the social and emotional support that you need?: Yes  Do you have a Living Will?: Yes  Advance Directives     Power of Michelle Smalls Will ACP-Advance Directive ACP-Power of     Not on File Not on File Not on File Not on File      General Health Risk Interventions:  · No Living Will: Advance Care Planning addressed with patient today    Health Habits/Nutrition:  Health Habits/Nutrition  Do you exercise for at least 20 minutes 2-3 times per week?: (!) No  Have you lost any weight without trying in the past 3 months?: No  Do you eat only one meal per day?: No  Have you seen the dentist within the past year?: Yes  Body mass index: (!) 28.71  Health Habits/Nutrition Interventions:  · Inadequate physical activity:  patient agrees to exercise for at least 150 minutes/week         Personalized Preventive Plan   Current Health Maintenance Status  Immunization History   Administered Date(s) Administered    COVID-19, Pfizer Purple top, DILUTE for use, 12+ yrs, 30mcg/0.3mL dose 05/27/2021, 06/17/2021        Health Maintenance   Topic Date Due    Hepatitis C screen  Never done    HIV screen  Never done    DTaP/Tdap/Td vaccine (1 - Tdap) Never done    Cervical cancer screen  Never done    Colon cancer screen colonoscopy  Never done    Shingles Vaccine (1 of 2) Never done    DEXA (modify frequency per FRAX score)  Never done    Breast cancer screen  09/26/2019    Flu vaccine (1) Never done    COVID-19 Vaccine (3 - Booster for Pfizer series) 12/17/2021    Pneumococcal 65+ years Vaccine (1 of 1 - PPSV23) Never done   [employer] Visit (AWV)  Never done    Depression Screen  01/07/2023    Potassium monitoring  01/13/2023    Creatinine monitoring 01/13/2023    Lipid screen  01/13/2027    Hepatitis A vaccine  Aged Out    Hepatitis B vaccine  Aged Out    Hib vaccine  Aged Out    Meningococcal (ACWY) vaccine  Aged Out     Recommendations for [employer] Due: see orders and patient instructions/AVS.  . Recommended screening schedule for the next 5-10 years is provided to the patient in written form: see Patient Jaimie Hernandez was seen today for medicare awv. Diagnoses and all orders for this visit:    Routine general medical examination at a health care facility    Essential hypertension  -     EKG 12 lead; Future  -     EKG 12 lead    Vitamin D deficiency  -     vitamin D (ERGOCALCIFEROL) 1.25 MG (42326 UT) CAPS capsule; One weekly for 12 weeks then vit d 3  2000 daily    Mixed hyperlipidemia  -     pravastatin (PRAVACHOL) 20 MG tablet; Take 0.5 tablets by mouth daily No food  -     Comprehensive Metabolic Panel; Future  -     Lipid Panel; Future  -     HIGH SENSITIVITY CRP; Future             Low  Fat diet        Add vit d    Statin  And   3 m o  Lab b efore           I educated the patient about all medications. Make sure they were correct and educated  on the risk associated with missing meds or taking them incorrectly. A list of medications is being sent home with patient today. I informed patient about the risk associated with noncompliance of medication and taking medications incorrectly. Appropriate follow-up with myself and all specialist.  Encourage family members to take active role in assisting with medications and medical care. If any confusion should develop to notify my office immediately to avoid risk of worsening medical condition. [facility]      A great deal of time spent reviewing medications, diet, exercise, social issues. Also reviewing the chart before entering the room with patient and finishing charting after leaving patient's room.  More than half of that time was spent face to face with the patient in counseling and coordinating care.

## 2022-04-15 DIAGNOSIS — E78.2 MIXED HYPERLIPIDEMIA: ICD-10-CM

## 2022-04-15 LAB
ALBUMIN SERPL-MCNC: 4.3 G/DL (ref 3.5–5.2)
ALP BLD-CCNC: 88 U/L (ref 35–104)
ALT SERPL-CCNC: 27 U/L (ref 0–32)
ANION GAP SERPL CALCULATED.3IONS-SCNC: 15 MMOL/L (ref 7–16)
AST SERPL-CCNC: 22 U/L (ref 0–31)
BILIRUB SERPL-MCNC: 0.6 MG/DL (ref 0–1.2)
BUN BLDV-MCNC: 12 MG/DL (ref 6–23)
CALCIUM SERPL-MCNC: 8.9 MG/DL (ref 8.6–10.2)
CHLORIDE BLD-SCNC: 103 MMOL/L (ref 98–107)
CHOLESTEROL, TOTAL: 201 MG/DL (ref 0–199)
CO2: 24 MMOL/L (ref 22–29)
CREAT SERPL-MCNC: 1.1 MG/DL (ref 0.5–1)
GFR AFRICAN AMERICAN: >60
GFR NON-AFRICAN AMERICAN: 50 ML/MIN/1.73
GLUCOSE BLD-MCNC: 104 MG/DL (ref 74–99)
HDLC SERPL-MCNC: 66 MG/DL
LDL CHOLESTEROL CALCULATED: 114 MG/DL (ref 0–99)
POTASSIUM SERPL-SCNC: 3.5 MMOL/L (ref 3.5–5)
SODIUM BLD-SCNC: 142 MMOL/L (ref 132–146)
TOTAL PROTEIN: 6.6 G/DL (ref 6.4–8.3)
TRIGL SERPL-MCNC: 103 MG/DL (ref 0–149)
VLDLC SERPL CALC-MCNC: 21 MG/DL

## 2022-04-22 ENCOUNTER — OFFICE VISIT (OUTPATIENT)
Dept: PRIMARY CARE CLINIC | Age: 67
End: 2022-04-22
Payer: MEDICARE

## 2022-04-22 VITALS — SYSTOLIC BLOOD PRESSURE: 128 MMHG | DIASTOLIC BLOOD PRESSURE: 78 MMHG

## 2022-04-22 DIAGNOSIS — E78.2 MIXED HYPERLIPIDEMIA: Primary | Chronic | ICD-10-CM

## 2022-04-22 DIAGNOSIS — N18.31 STAGE 3A CHRONIC KIDNEY DISEASE (HCC): ICD-10-CM

## 2022-04-22 DIAGNOSIS — E11.9 DIET-CONTROLLED DIABETES MELLITUS (HCC): ICD-10-CM

## 2022-04-22 DIAGNOSIS — E55.9 VITAMIN D DEFICIENCY: ICD-10-CM

## 2022-04-22 PROBLEM — N18.30 CHRONIC RENAL DISEASE, STAGE III (HCC): Status: ACTIVE | Noted: 2022-04-22

## 2022-04-22 PROCEDURE — G8400 PT W/DXA NO RESULTS DOC: HCPCS | Performed by: FAMILY MEDICINE

## 2022-04-22 PROCEDURE — 3017F COLORECTAL CA SCREEN DOC REV: CPT | Performed by: FAMILY MEDICINE

## 2022-04-22 PROCEDURE — 1123F ACP DISCUSS/DSCN MKR DOCD: CPT | Performed by: FAMILY MEDICINE

## 2022-04-22 PROCEDURE — 99213 OFFICE O/P EST LOW 20 MIN: CPT | Performed by: FAMILY MEDICINE

## 2022-04-22 PROCEDURE — 1036F TOBACCO NON-USER: CPT | Performed by: FAMILY MEDICINE

## 2022-04-22 PROCEDURE — 3046F HEMOGLOBIN A1C LEVEL >9.0%: CPT | Performed by: FAMILY MEDICINE

## 2022-04-22 PROCEDURE — G8428 CUR MEDS NOT DOCUMENT: HCPCS | Performed by: FAMILY MEDICINE

## 2022-04-22 PROCEDURE — 2022F DILAT RTA XM EVC RTNOPTHY: CPT | Performed by: FAMILY MEDICINE

## 2022-04-22 PROCEDURE — 4040F PNEUMOC VAC/ADMIN/RCVD: CPT | Performed by: FAMILY MEDICINE

## 2022-04-22 PROCEDURE — 1090F PRES/ABSN URINE INCON ASSESS: CPT | Performed by: FAMILY MEDICINE

## 2022-04-22 PROCEDURE — G8417 CALC BMI ABV UP PARAM F/U: HCPCS | Performed by: FAMILY MEDICINE

## 2022-04-22 ASSESSMENT — ENCOUNTER SYMPTOMS
ALLERGIC/IMMUNOLOGIC NEGATIVE: 1
GASTROINTESTINAL NEGATIVE: 1
EYES NEGATIVE: 1
RESPIRATORY NEGATIVE: 1

## 2022-04-22 ASSESSMENT — PATIENT HEALTH QUESTIONNAIRE - PHQ9
2. FEELING DOWN, DEPRESSED OR HOPELESS: 0
SUM OF ALL RESPONSES TO PHQ QUESTIONS 1-9: 0
SUM OF ALL RESPONSES TO PHQ9 QUESTIONS 1 & 2: 0
SUM OF ALL RESPONSES TO PHQ QUESTIONS 1-9: 0
SUM OF ALL RESPONSES TO PHQ QUESTIONS 1-9: 0
1. LITTLE INTEREST OR PLEASURE IN DOING THINGS: 0
SUM OF ALL RESPONSES TO PHQ QUESTIONS 1-9: 0

## 2022-04-22 NOTE — PROGRESS NOTES
22  Name: Melvin Varela    : 1955    Sex: female    Age: 73 y.o. Subjective:  Chief Complaint: Patient is here for    3m ock  Re   Chol  Vit      Feel rachel firs ton  Statin  No cp ros ob     chol doown  crea tsble      Review of Systems   Constitutional: Negative. HENT: Negative. Eyes: Negative. Respiratory: Negative. Cardiovascular: Negative. Gastrointestinal: Negative. Endocrine: Negative. Genitourinary: Negative. Musculoskeletal: Negative. Skin: Negative. Allergic/Immunologic: Negative. Neurological: Negative. Hematological: Negative. Psychiatric/Behavioral: Negative.           Current Outpatient Medications:     vitamin D (ERGOCALCIFEROL) 1.25 MG (14523 UT) CAPS capsule, One weekly for 12 weeks then vit d 3  2000 daily, Disp: 12 capsule, Rfl: 0    pravastatin (PRAVACHOL) 20 MG tablet, Take 0.5 tablets by mouth daily No food, Disp: 30 tablet, Rfl: 5    triamterene-hydroCHLOROthiazide (MAXZIDE-25) 37.5-25 MG per tablet, Take 1 tablet by mouth daily, Disp: 90 tablet, Rfl: 3    buPROPion (WELLBUTRIN XL) 300 MG extended release tablet, TAKE 1 TABLET BY MOUTH EVERY DAY IN THE MORNING, Disp: 90 tablet, Rfl: 3    citalopram (CELEXA) 20 MG tablet, Take 1 tablet by mouth daily, Disp: 90 tablet, Rfl: 3    RESTASIS 0.05 % ophthalmic emulsion, INSTILL 1 DROP INTO BOTH EYES EVERY 12 HOURS, Disp: , Rfl: 11    acyclovir (ZOVIRAX) 5 % CREA, , Disp: , Rfl:   No Known Allergies  Social History     Socioeconomic History    Marital status:      Spouse name: Not on file    Number of children: Not on file    Years of education: Not on file    Highest education level: Not on file   Occupational History    Not on file   Tobacco Use    Smoking status: Former Smoker     Packs/day: 0.50     Years: 10.00     Pack years: 5.00     Types: Cigarettes     Quit date:      Years since quittin.3    Smokeless tobacco: Never Used    Tobacco comment: QUIT AGE 38 Vaping Use    Vaping Use: Never used   Substance and Sexual Activity    Alcohol use: Not on file    Drug use: Not on file    Sexual activity: Not on file   Other Topics Concern    Not on file   Social History Narrative        Bone Density Scan - (2009)    DEPRESSION    HYPERLIPIDEMIA    DIV---ENGAGED---    TWO KIDS----GIRLS------two grands    AT [employer]---------------------retired   9-     then 10 hrs a week    FOCUS ISSUE    ELEV BP    QUIT SMOKING AGE 38    SIS AGE 62 WITH INTRACRANIAL BLEED----FINE    THREE OTHER SISTERS FINE ALSO    ABNORMAL BASELINE EKG    TMT 2-13    MOM  QUICKLY AT 89 9-13 WITH BAD HEART VALVE    DAD  LUNG CA----AGE 68    Minerva Bees  1955 Page #2    HTN 9-    INSOMNIA    DEPRESSION ---AFTER MOM     FOOT PAIN DR HERNANDEZ 10 -    LEFT TOTAL KNEE SURGERY DR Paty Galloway 7-15    REFUSES COLONOSCOPY 815    LEFT ABDOMEN SHINGLES 12-16 AGAIN 19    DAUGHTER UBALDO---NOT TALK---TWO KIDS    ARINA---ALL OK---DATES LULÚ WHO OWNS MR [address]ZEGORZ CASTAÑEDA 12 YRS OLDER RETAIL    Add  With dr Shameka Bee and rx with    Angel Alejandra  And  Bharat   Due to elev bp    Sister diagnosed with lymphoma  at age 76years old and survived    Brother-in-law  at 61years old from motor cycle accident     Fiancé had a stroke  and 68years old with residual symptoms     Social Determinants of Health     Financial Resource Strain:     Difficulty of Paying Living Expenses: Not on file   Food Insecurity:     Worried About Running Out of Food in the Last Year: Not on file    Leora of Food in the Last Year: Not on file   Transportation Needs:     Lack of Transportation (Medical): Not on file    Lack of Transportation (Non-Medical):  Not on file   Physical Activity:     Days of Exercise per Week: Not on file    Minutes of Exercise per Session: Not on file   Stress:     Feeling of Stress : Not on file   Social Connections:     Frequency of Communication with Friends and Family: Not on file    Frequency of Social Gatherings with Friends and Family: Not on file    Attends Mandaeism Services: Not on file    Active Member of Clubs or Organizations: Not on file    Attends Club or Organization Meetings: Not on file    Marital Status: Not on file   Intimate Partner Violence:     Fear of Current or Ex-Partner: Not on file    Emotionally Abused: Not on file    Physically Abused: Not on file    Sexually Abused: Not on file   Housing Stability:     Unable to Pay for Housing in the Last Year: Not on file    Number of [place] in the Last Year: Not on file    Unstable Housing in the Last Year: Not on file      Past Medical History:   Diagnosis Date    History of shingles     2016 AND 2018    Hyperlipidemia     Hypertension      No family history on file. Past Surgical History:   Procedure Laterality Date    KNEE SURGERY Left 2015    TOTAL LEFT KNEE DR Sindi Landeros      Vitals:    04/22/22 1230   BP: 128/78       Objective:    Physical Exam  Vitals reviewed. Constitutional:       Appearance: She is well-developed. HENT:      Head: Normocephalic. Eyes:      Pupils: Pupils are equal, round, and reactive to light. Cardiovascular:      Rate and Rhythm: Normal rate and regular rhythm. Pulmonary:      Effort: Pulmonary effort is normal.      Breath sounds: Normal breath sounds. Abdominal:      Palpations: Abdomen is soft. Musculoskeletal:         General: Normal range of motion. Cervical back: Normal range of motion. Skin:     General: Skin is warm. Neurological:      Mental Status: She is alert and oriented to person, place, and time. Psychiatric:         Behavior: Behavior normal.         Diagnoses and all orders for this visit:    Mixed hyperlipidemia    Stage 3a chronic kidney disease ([facility] [address]        Comments: low  Fat  Diet e xe lsoe wt         I educated the patient about all medications.   Make sure they were correct and educated  on the risk associated with missing meds or taking them incorrectly. A list of medications is being sent home with patient today. Check blood pressure at home twice a day. Low-salt low caffeine diet. Call if systolic blood pressure is above 555 and diastolic blood pressures above 85. Only use a upper arm digital cuff. I informed patient about the risk associated with noncompliance of medication and taking medications incorrectly. Appropriate follow-up with myself and all specialist.  Encourage family members to take active role in assisting with medications and medical care. If any confusion should develop to notify my office immediately to avoid risk of worsening medical condition      A great deal of time spent reviewing medications, diet, exercise, social issues. Also reviewing the chart before entering the room with patient and finishing charting after leaving patient's room. More than half of that time was spent face to face with the patient in counseling and coordinating care. Aggressive low-fat diet. Avoid red meats, greasy fried foods, dairy products. Avoid processed foods. Take cholesterol medications without food. Follow Up: Return in about 6 months (around 10/22/2022) for Lab Before.      Seen by:  Guy Padron DO

## 2022-09-23 DIAGNOSIS — E78.2 MIXED HYPERLIPIDEMIA: ICD-10-CM

## 2022-09-24 NOTE — TELEPHONE ENCOUNTER
Dr. Kat Oliva, DO,     Pharmacy out of pravastatin refills. Order pended for your convenience. Last visit: 2022, Next visit: 10/21/2022    See encounter note(s) below for complete details. Please let me know if you have any questions. Thank you,  Festus Ramirez, PharmD, BCACP, [address] [facility], toll free: [phone], option 1    =======================================================    POPULATION HEALTH CLINICAL PHARMACY REVIEW: ADHERENCE  Identified care gap per [place]: fills at [facility] : Statin adherence    Last Visit: 2022    ASSESSMENT  [id number] Records claims through 2022 (YTD [facility] [place] =  79%; Potential Fail Date: 2022 ):   Pravastatin 20mg daily last filled on 2022 for 30 day supply.  Next refill due: 08/15/2022    Per [place] Portal: same as above    Last pravastatin 20mg Rx sent on 2022 for 60ds (30 tabs) with 5 refills to [facility]; si.5mg tab (10mg) daily    Per Reconciled Dispense Report as of 2022:  Filled for 30ds on 2022, 90ds on 2022 at [facility]; has been filling 30 tabs for 30ds at [facility] (2022, 2022, 2022, 2022)    Lab Results   Component Value Date    CHOL 201 (H) 04/15/2022    TRIG 103 04/15/2022    HDL 66 04/15/2022    LDLCALC 114 (H) 04/15/2022     ALT   Date Value Ref Range Status   04/15/2022 27 0 - 32 U/L Final     AST   Date Value Ref Range Status   04/15/2022 22 0 - 31 U/L Final     The 10-year ASCVD risk score (Abby Silva, et al., 2013) is: 6.9%    Values used to calculate the score:      Age: 73 years      Sex: Female      Is Non- : No      Diabetic: No      Tobacco smoker: No      Systolic Blood Pressure: 127 mmHg      Is BP treated: Yes      HDL Cholesterol: 66 mg/dL      Total Cholesterol: 201 mg/dL     PLAN  The following are interventions that have been identified:   - Patient overdue refilling pravastatin and active on home medication list.   - Patient eligible for 90 day supply of pravastatin  - Need to confirm if patient is taking pravastatin differently than prescribed and need to obtain an updated order if so    Attempting to reach patient to discuss statin does. Unable to leave message as mailbox full. Called [facility] - discussed Rx was transferred from [facility] correctly but when entered into system, days supply was incorrect for 30 tabs. Explained situation regarding poor adherence to her insurance/CMS even though patient likely taking correctly - suggested if able to rebill at all that would be great but understand if can't as it has not been within recent 2-4 weeks. [facility] employee stated that pravastatin Rx is out of refills so if a new Rx is sent over they can make sure to start filling/billing correctly. Will send refill request to PCP.      Future Appointments   Date Time Provider Cory Juarez   10/21/2022 12:30 PM Urban Pals, DO [facility]     Esvin Courtney, PharmD, [facility], [address]   Department, toll free: [phone], option 1    =======================================================    For Pharmacy Admin Tracking Only  Recommendation Provided To: Provider: 1 via Note to Provider  Intervention Detail: Adherence Monitorin and Refill(s) Provided  Gap Closed?: No   Intervention Accepted By: Provider: 1  Time Spent (min): 45

## 2022-09-27 RX ORDER — PRAVASTATIN SODIUM 20 MG
10 TABLET ORAL DAILY
Qty: 90 TABLET | Refills: 5 | Status: SHIPPED
Start: 2022-09-27 | End: 2022-10-21 | Stop reason: SDUPTHER

## 2022-10-14 DIAGNOSIS — E55.9 VITAMIN D DEFICIENCY: ICD-10-CM

## 2022-10-14 DIAGNOSIS — E78.2 MIXED HYPERLIPIDEMIA: Chronic | ICD-10-CM

## 2022-10-14 DIAGNOSIS — N18.31 STAGE 3A CHRONIC KIDNEY DISEASE (HCC): ICD-10-CM

## 2022-10-14 DIAGNOSIS — E11.9 DIET-CONTROLLED DIABETES MELLITUS (HCC): ICD-10-CM

## 2022-10-14 LAB
ALBUMIN SERPL-MCNC: 4.5 G/DL (ref 3.5–5.2)
ALP BLD-CCNC: 87 U/L (ref 35–104)
ALT SERPL-CCNC: 31 U/L (ref 0–32)
ANION GAP SERPL CALCULATED.3IONS-SCNC: 15 MMOL/L (ref 7–16)
AST SERPL-CCNC: 29 U/L (ref 0–31)
BASOPHILS ABSOLUTE: 0.06 E9/L (ref 0–0.2)
BASOPHILS RELATIVE PERCENT: 1.1 % (ref 0–2)
BILIRUB SERPL-MCNC: 0.7 MG/DL (ref 0–1.2)
BUN BLDV-MCNC: 15 MG/DL (ref 6–23)
CALCIUM SERPL-MCNC: 9.7 MG/DL (ref 8.6–10.2)
CHLORIDE BLD-SCNC: 105 MMOL/L (ref 98–107)
CHOLESTEROL, TOTAL: 258 MG/DL (ref 0–199)
CO2: 23 MMOL/L (ref 22–29)
CREAT SERPL-MCNC: 1.1 MG/DL (ref 0.5–1)
EOSINOPHILS ABSOLUTE: 0.23 E9/L (ref 0.05–0.5)
EOSINOPHILS RELATIVE PERCENT: 4.4 % (ref 0–6)
GFR AFRICAN AMERICAN: >60
GFR NON-AFRICAN AMERICAN: 50 ML/MIN/1.73
GLUCOSE BLD-MCNC: 92 MG/DL (ref 74–99)
HBA1C MFR BLD: 5.5 % (ref 4–5.6)
HCT VFR BLD CALC: 40.7 % (ref 34–48)
HDLC SERPL-MCNC: 66 MG/DL
HEMOGLOBIN: 13.3 G/DL (ref 11.5–15.5)
IMMATURE GRANULOCYTES #: 0.01 E9/L
IMMATURE GRANULOCYTES %: 0.2 % (ref 0–5)
LDL CHOLESTEROL CALCULATED: 169 MG/DL (ref 0–99)
LYMPHOCYTES ABSOLUTE: 1.54 E9/L (ref 1.5–4)
LYMPHOCYTES RELATIVE PERCENT: 29.5 % (ref 20–42)
MCH RBC QN AUTO: 30.4 PG (ref 26–35)
MCHC RBC AUTO-ENTMCNC: 32.7 % (ref 32–34.5)
MCV RBC AUTO: 93.1 FL (ref 80–99.9)
MONOCYTES ABSOLUTE: 0.4 E9/L (ref 0.1–0.95)
MONOCYTES RELATIVE PERCENT: 7.7 % (ref 2–12)
NEUTROPHILS ABSOLUTE: 2.98 E9/L (ref 1.8–7.3)
NEUTROPHILS RELATIVE PERCENT: 57.1 % (ref 43–80)
PDW BLD-RTO: 13.9 FL (ref 11.5–15)
PLATELET # BLD: 281 E9/L (ref 130–450)
PMV BLD AUTO: 10.6 FL (ref 7–12)
POTASSIUM SERPL-SCNC: 4 MMOL/L (ref 3.5–5)
RBC # BLD: 4.37 E12/L (ref 3.5–5.5)
SODIUM BLD-SCNC: 143 MMOL/L (ref 132–146)
TOTAL PROTEIN: 7.2 G/DL (ref 6.4–8.3)
TRIGL SERPL-MCNC: 113 MG/DL (ref 0–149)
VITAMIN D 25-HYDROXY: 49 NG/ML (ref 30–100)
VLDLC SERPL CALC-MCNC: 23 MG/DL
WBC # BLD: 5.2 E9/L (ref 4.5–11.5)

## 2022-10-21 ENCOUNTER — OFFICE VISIT (OUTPATIENT)
Dept: PRIMARY CARE CLINIC | Age: 67
End: 2022-10-21
Payer: MEDICARE

## 2022-10-21 VITALS
HEIGHT: 60 IN | WEIGHT: 149.2 LBS | BODY MASS INDEX: 29.29 KG/M2 | TEMPERATURE: 98.5 F | SYSTOLIC BLOOD PRESSURE: 135 MMHG | DIASTOLIC BLOOD PRESSURE: 82 MMHG

## 2022-10-21 DIAGNOSIS — E55.9 VITAMIN D DEFICIENCY: ICD-10-CM

## 2022-10-21 DIAGNOSIS — E78.2 MIXED HYPERLIPIDEMIA: ICD-10-CM

## 2022-10-21 DIAGNOSIS — I10 ESSENTIAL HYPERTENSION: Primary | Chronic | ICD-10-CM

## 2022-10-21 DIAGNOSIS — F41.9 ANXIETY: Chronic | ICD-10-CM

## 2022-10-21 DIAGNOSIS — N18.31 STAGE 3A CHRONIC KIDNEY DISEASE (HCC): ICD-10-CM

## 2022-10-21 PROCEDURE — 99214 OFFICE O/P EST MOD 30 MIN: CPT | Performed by: FAMILY MEDICINE

## 2022-10-21 PROCEDURE — G8484 FLU IMMUNIZE NO ADMIN: HCPCS | Performed by: FAMILY MEDICINE

## 2022-10-21 PROCEDURE — 3017F COLORECTAL CA SCREEN DOC REV: CPT | Performed by: FAMILY MEDICINE

## 2022-10-21 PROCEDURE — 1090F PRES/ABSN URINE INCON ASSESS: CPT | Performed by: FAMILY MEDICINE

## 2022-10-21 PROCEDURE — G8417 CALC BMI ABV UP PARAM F/U: HCPCS | Performed by: FAMILY MEDICINE

## 2022-10-21 PROCEDURE — 1036F TOBACCO NON-USER: CPT | Performed by: FAMILY MEDICINE

## 2022-10-21 PROCEDURE — 1123F ACP DISCUSS/DSCN MKR DOCD: CPT | Performed by: FAMILY MEDICINE

## 2022-10-21 PROCEDURE — G8428 CUR MEDS NOT DOCUMENT: HCPCS | Performed by: FAMILY MEDICINE

## 2022-10-21 PROCEDURE — G8400 PT W/DXA NO RESULTS DOC: HCPCS | Performed by: FAMILY MEDICINE

## 2022-10-21 RX ORDER — CITALOPRAM 10 MG/1
10 TABLET ORAL DAILY
Qty: 30 TABLET | Refills: 2 | Status: SHIPPED | OUTPATIENT
Start: 2022-10-21

## 2022-10-21 RX ORDER — PRAVASTATIN SODIUM 20 MG
20 TABLET ORAL DAILY
Qty: 90 TABLET | Refills: 5 | Status: SHIPPED | OUTPATIENT
Start: 2022-10-21

## 2022-10-21 ASSESSMENT — PATIENT HEALTH QUESTIONNAIRE - PHQ9
SUM OF ALL RESPONSES TO PHQ QUESTIONS 1-9: 0
1. LITTLE INTEREST OR PLEASURE IN DOING THINGS: 0
SUM OF ALL RESPONSES TO PHQ9 QUESTIONS 1 & 2: 0
SUM OF ALL RESPONSES TO PHQ QUESTIONS 1-9: 0
2. FEELING DOWN, DEPRESSED OR HOPELESS: 0

## 2022-10-21 ASSESSMENT — ENCOUNTER SYMPTOMS
EYES NEGATIVE: 1
ALLERGIC/IMMUNOLOGIC NEGATIVE: 1
GASTROINTESTINAL NEGATIVE: 1
RESPIRATORY NEGATIVE: 1

## 2022-10-21 NOTE — PROGRESS NOTES
10/21/22  Name: Cade Ferrer    : 1955    Sex: female    Age: 73 y.o. Subjective:  Chief Complaint: Patient is here for   6 mo ck re       chol  vit   d  arth     anx            Feel ok   stress with  BF    chg  sicne  cva  w t  up she   wants  off clelx a  due to wt gain  no cp ro ob work par times  lab with chol inc  On statin half  daily   chl inc  She admits  taking  ibf q id    todl to do it       Review of Systems   Constitutional: Negative. HENT: Negative. Eyes: Negative. Respiratory: Negative. Cardiovascular: Negative. Gastrointestinal: Negative. Endocrine: Negative. Genitourinary: Negative. Musculoskeletal: Negative. Skin: Negative. Allergic/Immunologic: Negative. Neurological: Negative. Hematological: Negative. Psychiatric/Behavioral: Negative.          Current Outpatient Medications:     pravastatin (PRAVACHOL) 20 MG tablet, Take 1 tablet by mouth daily No food, Disp: 90 tablet, Rfl: 5    citalopram (CELEXA) 10 MG tablet, Take 1 tablet by mouth daily, Disp: 30 tablet, Rfl: 2    vitamin D (ERGOCALCIFEROL) 1.25 MG (54724 UT) CAPS capsule, One weekly for 12 weeks then vit d 3  2000 daily, Disp: 12 capsule, Rfl: 0    triamterene-hydroCHLOROthiazide (MAXZIDE-25) 37.5-25 MG per tablet, Take 1 tablet by mouth daily, Disp: 90 tablet, Rfl: 3    buPROPion (WELLBUTRIN XL) 300 MG extended release tablet, TAKE 1 TABLET BY MOUTH EVERY DAY IN THE MORNING, Disp: 90 tablet, Rfl: 3    RESTASIS 0.05 % ophthalmic emulsion, INSTILL 1 DROP INTO BOTH EYES EVERY 12 HOURS, Disp: , Rfl: 11    acyclovir (ZOVIRAX) 5 % CREA, , Disp: , Rfl:   No Known Allergies  Social History     Socioeconomic History    Marital status:      Spouse name: Not on file    Number of children: Not on file    Years of education: Not on file    Highest education level: Not on file   Occupational History    Not on file   Tobacco Use    Smoking status: Former     Packs/day: 0.50     Years: 10.00 Pack years: 5.00     Types: Cigarettes     Quit date:      Years since quittin.8    Smokeless tobacco: Never    Tobacco comments:     QUIT AGE 38   Vaping Use    Vaping Use: Never used   Substance and Sexual Activity    Alcohol use: Not on file    Drug use: Not on file    Sexual activity: Not on file   Other Topics Concern    Not on file   Social History Narrative        Bone Density Scan - (2009)    DEPRESSION    HYPERLIPIDEMIA    DIV---ENGAGED---    TWO KIDS----GIRLS------two grands    AT [employer]---------------------retired   9-21     then 10 hrs a week    FOCUS ISSUE    ELEV BP    QUIT SMOKING AGE 38    SIS AGE 60 WITH INTRACRANIAL BLEED----FINE    THREE OTHER SISTERS FINE ALSO    ABNORMAL BASELINE EKG    TMT 2-13    MOM  QUICKLY AT 89 9-13 WITH BAD HEART VALVE    DAD  LUNG CA----AGE 68    Minerva Bees  1955 Page #2    HTN 9    INSOMNIA    DEPRESSION ---AFTER MOM     FOOT PAIN DR HERNANDEZ 10 -13    LEFT TOTAL KNEE SURGERY DR Malcom [place] 7-15    REFUSES COLONOSCOPY 8-15    LEFT ABDOMEN SHINGLES 12-16 AGAIN 5-18    DAUGHTER UBALDO---NOT TALK---TWO KIDS    ARINA---ALL OK---DATES LULÚ WHO OWNS MR [address]ZE[employer]Z [employer]STAÑ[employer]A 12 YRS OLDER RETAIL    Add  With dr Arron Gutierrez and rx with    Liliana Bergman  And  Dc   Due to elev bp    Sister diagnosed with lymphoma  at age 76years old and survived    Brother-in-law  at 61years old from motor cycle accident     Kiko had a stroke  and 68years old with residual symptoms--poor short term memory---super inactive    Elev  crea        admitted    ibf qid  10-22     us  kidney   10-22     Social Determinants of Health     Financial Resource Strain: Not on file   Food Insecurity: Not on file   Transportation Needs: Not on file   Physical Activity: Not on file   Stress: Not on file   Social Connections: Not on file   Intimate Partner Violence: Not on file   Housing Stability: Not on file      Past Medical History:   Diagnosis Date History of shingles     2016 AND 2018    Hyperlipidemia     Hypertension      No family history on file. Past Surgical History:   Procedure Laterality Date    KNEE SURGERY Left 2015    TOTAL LEFT KNEE DR Kelly Basilio      Vitals:    10/21/22 1214   BP: 135/82   Temp: 98.5 °F (36.9 °C)   Weight: 149 lb 3.2 oz (67.7 kg)   Height: 5' (1.524 m)       Objective:    Physical Exam  Vitals reviewed. Constitutional:       Appearance: Normal appearance. She is well-developed. HENT:      Head: Normocephalic. Right Ear: Tympanic membrane normal.      Left Ear: Tympanic membrane normal.      Nose: Nose normal.      Mouth/Throat:      Mouth: Mucous membranes are moist.   Eyes:      Pupils: Pupils are equal, round, and reactive to light. Cardiovascular:      Rate and Rhythm: Normal rate and regular rhythm. Pulmonary:      Effort: Pulmonary effort is normal.      Breath sounds: Normal breath sounds. Abdominal:      General: Bowel sounds are normal.      Palpations: Abdomen is soft. Musculoskeletal:         General: Normal range of motion. Cervical back: Normal range of motion. Skin:     General: Skin is warm. Neurological:      Mental Status: She is alert and oriented to person, place, and time. Psychiatric:         Behavior: Behavior normal.       Jeff Walsh was seen today for discuss labs. Diagnoses and all orders for this visit:    Essential hypertension  -     Comprehensive Metabolic Panel; Future  -     Lipid Panel; Future    Mixed hyperlipidemia  -     pravastatin (PRAVACHOL) 20 MG tablet; Take 1 tablet by mouth daily No food  -     Comprehensive Metabolic Panel; Future  -     Lipid Panel; Future    Anxiety  -     citalopram (CELEXA) 10 MG tablet; Take 1 tablet by mouth daily    Stage 3a chronic kidney disease ([facility] [address]  -     US RETROPERITONEAL LIMITED; Future  -     Comprehensive Metabolic Panel; Future  -     Lipid Panel;  Future    Vitamin D deficiency      Comments: inc prachol  20 mg   day --ius kidneys    no nsiads      Dec  celxa  10 mg one mo  then    5 m g  til  1-15  then  hald   of       5   oen o and dc        I educated the patient about all medications. Make sure they were correct and educated  on the risk associated with missing meds or taking them incorrectly. A list of medications is being sent home with patient today. Check blood pressure at home twice a day. Low-salt low caffeine diet. Call if systolic blood pressure is above 884 and diastolic blood pressures above 85. Only use a upper arm digital cuff. I informed patient about the risk associated with noncompliance of medication and taking medications incorrectly. Appropriate follow-up with myself and all specialist.  Encourage family members to take active role in assisting with medications and medical care. If any confusion should develop to notify my office immediately to avoid risk of worsening medical condition      A great deal of time spent reviewing medications, diet, exercise, social issues. Also reviewing the chart before entering the room with patient and finishing charting after leaving patient's room. More than half of that time was spent face to face with the patient in counseling and coordinating care. ,..Aggressive low-fat diet. Avoid red meats, greasy fried foods, dairy products. Avoid processed foods. Take cholesterol medications without food. Follow Up: Return in about 4 months (around 2/21/2023) for Lab Before.      Seen by:  Meg Greco DO

## 2022-11-03 ENCOUNTER — TELEPHONE (OUTPATIENT)
Dept: PRIMARY CARE CLINIC | Age: 67
End: 2022-11-03

## 2022-11-03 DIAGNOSIS — N28.89 LEFT KIDNEY MASS: Primary | ICD-10-CM

## 2022-11-03 NOTE — TELEPHONE ENCOUNTER
5 patient there is a cyst or possible mass in the left kidney. They are recommending a CAT scan. I am putting the order in for that. Tell her not to worry, may be nothing at all. Make sure she is scheduled to see me a day or 2 after the CAT scan.

## 2022-11-29 DIAGNOSIS — I10 ESSENTIAL HYPERTENSION: Chronic | ICD-10-CM

## 2022-11-29 DIAGNOSIS — E78.2 MIXED HYPERLIPIDEMIA: ICD-10-CM

## 2022-11-29 DIAGNOSIS — N18.31 STAGE 3A CHRONIC KIDNEY DISEASE (HCC): ICD-10-CM

## 2022-11-29 LAB
ALBUMIN SERPL-MCNC: 4.3 G/DL (ref 3.5–5.2)
ALP BLD-CCNC: 87 U/L (ref 35–104)
ALT SERPL-CCNC: 19 U/L (ref 0–32)
ANION GAP SERPL CALCULATED.3IONS-SCNC: 12 MMOL/L (ref 7–16)
AST SERPL-CCNC: 19 U/L (ref 0–31)
BILIRUB SERPL-MCNC: 0.8 MG/DL (ref 0–1.2)
BUN BLDV-MCNC: 16 MG/DL (ref 6–23)
CALCIUM SERPL-MCNC: 9.6 MG/DL (ref 8.6–10.2)
CHLORIDE BLD-SCNC: 103 MMOL/L (ref 98–107)
CHOLESTEROL, TOTAL: 214 MG/DL (ref 0–199)
CO2: 26 MMOL/L (ref 22–29)
CREAT SERPL-MCNC: 1.1 MG/DL (ref 0.5–1)
GFR SERPL CREATININE-BSD FRML MDRD: 55 ML/MIN/1.73
GLUCOSE BLD-MCNC: 99 MG/DL (ref 74–99)
HDLC SERPL-MCNC: 67 MG/DL
LDL CHOLESTEROL CALCULATED: 127 MG/DL (ref 0–99)
POTASSIUM SERPL-SCNC: 3.9 MMOL/L (ref 3.5–5)
SODIUM BLD-SCNC: 141 MMOL/L (ref 132–146)
TOTAL PROTEIN: 6.6 G/DL (ref 6.4–8.3)
TRIGL SERPL-MCNC: 102 MG/DL (ref 0–149)
VLDLC SERPL CALC-MCNC: 20 MG/DL

## 2022-12-02 ENCOUNTER — HOSPITAL ENCOUNTER (OUTPATIENT)
Dept: CT IMAGING | Age: 67
End: 2022-12-02
Payer: MEDICARE

## 2022-12-02 DIAGNOSIS — N28.89 LEFT KIDNEY MASS: ICD-10-CM

## 2022-12-02 PROCEDURE — 74178 CT ABD&PLV WO CNTR FLWD CNTR: CPT

## 2022-12-02 PROCEDURE — 6360000004 HC RX CONTRAST MEDICATION: Performed by: RADIOLOGY

## 2022-12-02 RX ADMIN — IOPAMIDOL 75 ML: 755 INJECTION, SOLUTION INTRAVENOUS at 08:23

## 2022-12-06 ENCOUNTER — OFFICE VISIT (OUTPATIENT)
Dept: PRIMARY CARE CLINIC | Age: 67
End: 2022-12-06
Payer: MEDICARE

## 2022-12-06 VITALS
HEIGHT: 60 IN | WEIGHT: 145.8 LBS | BODY MASS INDEX: 28.62 KG/M2 | DIASTOLIC BLOOD PRESSURE: 82 MMHG | TEMPERATURE: 97.5 F | SYSTOLIC BLOOD PRESSURE: 134 MMHG

## 2022-12-06 DIAGNOSIS — M51.16 HERNIATION OF LUMBAR INTERVERTEBRAL DISC WITH RADICULOPATHY: Primary | ICD-10-CM

## 2022-12-06 DIAGNOSIS — N18.31 STAGE 3A CHRONIC KIDNEY DISEASE (HCC): ICD-10-CM

## 2022-12-06 DIAGNOSIS — I10 ESSENTIAL HYPERTENSION: Chronic | ICD-10-CM

## 2022-12-06 DIAGNOSIS — E78.2 MIXED HYPERLIPIDEMIA: Chronic | ICD-10-CM

## 2022-12-06 PROCEDURE — 1123F ACP DISCUSS/DSCN MKR DOCD: CPT | Performed by: FAMILY MEDICINE

## 2022-12-06 PROCEDURE — 3078F DIAST BP <80 MM HG: CPT | Performed by: FAMILY MEDICINE

## 2022-12-06 PROCEDURE — 99214 OFFICE O/P EST MOD 30 MIN: CPT | Performed by: FAMILY MEDICINE

## 2022-12-06 PROCEDURE — 3074F SYST BP LT 130 MM HG: CPT | Performed by: FAMILY MEDICINE

## 2022-12-06 RX ORDER — PREDNISONE 10 MG/1
TABLET ORAL
Qty: 18 TABLET | Refills: 0 | Status: SHIPPED | OUTPATIENT
Start: 2022-12-06

## 2022-12-06 ASSESSMENT — ENCOUNTER SYMPTOMS
RESPIRATORY NEGATIVE: 1
EYES NEGATIVE: 1
BACK PAIN: 1
ALLERGIC/IMMUNOLOGIC NEGATIVE: 1
GASTROINTESTINAL NEGATIVE: 1

## 2022-12-06 ASSESSMENT — PATIENT HEALTH QUESTIONNAIRE - PHQ9
SUM OF ALL RESPONSES TO PHQ QUESTIONS 1-9: 0
SUM OF ALL RESPONSES TO PHQ QUESTIONS 1-9: 0
1. LITTLE INTEREST OR PLEASURE IN DOING THINGS: 0
SUM OF ALL RESPONSES TO PHQ QUESTIONS 1-9: 0
2. FEELING DOWN, DEPRESSED OR HOPELESS: 0
SUM OF ALL RESPONSES TO PHQ QUESTIONS 1-9: 0
SUM OF ALL RESPONSES TO PHQ9 QUESTIONS 1 & 2: 0

## 2022-12-06 NOTE — PROGRESS NOTES
22  Name: Danae Sesay    : 1955    Sex: female    Age: 73 y.o. Re ck  after  ct  and lab       Subjective:  Chief Complaint: Patient is here for r e ck after ct and lab   chol      back pain  righ tlow  back   chol vit  d  anx     Ct noted  lab with   chol better      crea same  Right lwo back pain  and right postr hip pain   for     3  months     worse over time      and last  few w eeks   did some lifting at work  She quot the  IBF  Sh efeels  BF  gave up--12 yrs older  he sits all day  Down  4 lbs'  Right low back pain with rad dwon right leg      Review of Systems   Constitutional: Negative. HENT: Negative. Eyes: Negative. Respiratory: Negative. Cardiovascular: Negative. Gastrointestinal: Negative. Endocrine: Negative. Genitourinary: Negative. Musculoskeletal:  Positive for back pain. Skin: Negative. Allergic/Immunologic: Negative. Neurological: Negative. Hematological: Negative. Psychiatric/Behavioral: Negative.          Current Outpatient Medications:     pravastatin (PRAVACHOL) 20 MG tablet, Take 1 tablet by mouth daily No food, Disp: 90 tablet, Rfl: 5    citalopram (CELEXA) 10 MG tablet, Take 1 tablet by mouth daily, Disp: 30 tablet, Rfl: 2    vitamin D (ERGOCALCIFEROL) 1.25 MG (54876 UT) CAPS capsule, One weekly for 12 weeks then vit d 3  2000 daily, Disp: 12 capsule, Rfl: 0    triamterene-hydroCHLOROthiazide (MAXZIDE-25) 37.5-25 MG per tablet, Take 1 tablet by mouth daily, Disp: 90 tablet, Rfl: 3    buPROPion (WELLBUTRIN XL) 300 MG extended release tablet, TAKE 1 TABLET BY MOUTH EVERY DAY IN THE MORNING, Disp: 90 tablet, Rfl: 3    RESTASIS 0.05 % ophthalmic emulsion, INSTILL 1 DROP INTO BOTH EYES EVERY 12 HOURS, Disp: , Rfl: 11    acyclovir (ZOVIRAX) 5 % CREA, , Disp: , Rfl:   No Known Allergies  Social History     Socioeconomic History    Marital status:      Spouse name: Not on file    Number of children: Not on file    Years of education: Not on file    Highest education level: Not on file   Occupational History    Not on file   Tobacco Use    Smoking status: Former     Packs/day: 0.50     Years: 10.00     Pack years: 5.00     Types: Cigarettes     Quit date:      Years since quittin.9    Smokeless tobacco: Never    Tobacco comments:     QUIT AGE 38   Vaping Use    Vaping Use: Never used   Substance and Sexual Activity    Alcohol use: Not on file    Drug use: Not on file    Sexual activity: Not on file   Other Topics Concern    Not on file   Social History Narrative        Bone Density Scan - (2009)    DEPRESSION    HYPERLIPIDEMIA    DIV---ENGAGED---    TWO KIDS----GIRLS------two grands    AT [employer]---------------------retired   -     then 10 hrs a week    FOCUS ISSUE    ELEV BP    QUIT SMOKING AGE 38    SIS AGE 62 WITH INTRACRANIAL BLEED----FINE    THREE OTHER SISTERS FINE ALSO    ABNORMAL BASELINE EKG    TMT 2-13    MOM  QUICKLY AT 89 9- WITH BAD HEART VALVE    DAD  LUNG CA----AGE 68    Minerva Bees  1955 Page #2    HTN     INSOMNIA    DEPRESSION ---AFTER MOM     FOOT PAIN DR HERNANDEZ 10 -13    LEFT TOTAL KNEE SURGERY DR Dayton Aguirre 7-15    REFUSES COLONOSCOPY 8-15    LEFT ABDOMEN SHINGLES  AGAIN 18    DAUGHTER UBALDO---NOT TALK---TWO KIDS    ARINA---ALL OK---DATES LULÚ WHO OWNS MR [address] 12 YRS OLDER RETAIL    Add  With dr Cathy Eckert and rx with    Minna Carranza   Due to elev bp    Sister diagnosed with lymphoma  at age 76years old and survived    Brother-in-law  at 61years old from motor cycle accident     Kiko had a stroke  and 68years old with residual symptoms--poor short term memory---super inactive    Elev  crea        admitted    ibf qid  10-22     us  kidney   10-22----    Us liver with  left renal lesion    ct done abdomen and ok         Taper celexa down to 5 mg   due to weight gain     Social Determinants of Health Financial Resource Strain: Not on file   Food Insecurity: Not on file   Transportation Needs: Not on file   Physical Activity: Not on file   Stress: Not on file   Social Connections: Not on file   Intimate Partner Violence: Not on file   Housing Stability: Not on file      Past Medical History:   Diagnosis Date    History of shingles     2016 AND 2018    Hyperlipidemia     Hypertension      No family history on file. Past Surgical History:   Procedure Laterality Date    KNEE SURGERY Left 2015    TOTAL LEFT KNEE DR Jose Cruz Simpson      Vitals:    12/06/22 1340   BP: 134/82   Temp: 97.5 °F (36.4 °C)   Weight: 145 lb 12.8 oz (66.1 kg)   Height: 5' (1.524 m)       Objective:    Physical Exam  Vitals reviewed. Constitutional:       Appearance: Normal appearance. She is well-developed. HENT:      Head: Normocephalic. Right Ear: Tympanic membrane normal.      Left Ear: Tympanic membrane normal.      Nose: Nose normal.      Mouth/Throat:      Mouth: Mucous membranes are moist.   Eyes:      Pupils: Pupils are equal, round, and reactive to light. Cardiovascular:      Rate and Rhythm: Normal rate and regular rhythm. Pulmonary:      Effort: Pulmonary effort is normal.      Breath sounds: Normal breath sounds. Abdominal:      General: Bowel sounds are normal.      Palpations: Abdomen is soft. Musculoskeletal:         General: Normal range of motion. Cervical back: Normal range of motion. Skin:     General: Skin is warm. Neurological:      Mental Status: She is alert and oriented to person, place, and time. Psychiatric:         Behavior: Behavior normal.       Niyah Menjivar was seen today for follow-up and hypertension.     Diagnoses and all orders for this visit:    Herniation of lumbar intervertebral disc with radiculopathy    Essential hypertension    Stage 3a chronic kidney disease ([address]    Mixed hyperlipidemia      Comments: lwo fat diet      discuss cct    x ray     I fnto better all for   sw  back an spien   meds      May do mri  may  do referal      I educated the patient about all medications. Make sure they were correct and educated  on the risk associated with missing meds or taking them incorrectly. A list of medications is being sent home with patient today. Check blood pressure at home twice a day. Low-salt low caffeine diet. Call if systolic blood pressure is above 868 and diastolic blood pressures above 85. Only use a upper arm digital cuff. Aggressive low-fat diet. Avoid red meats, greasy fried foods, dairy products. Avoid processed foods. Take cholesterol medications without food. I informed patient about the risk associated with noncompliance of medication and taking medications incorrectly. Appropriate follow-up with myself and all specialist.  Encourage family members to take active role in assisting with medications and medical care. If any confusion should develop to notify my office immediately to avoid risk of worsening medical condition  A great deal of time spent reviewing medications, diet, exercise, social issues. Also reviewing the chart before entering the room with patient and finishing charting after leaving patient's room. More than half of that time was spent face to face with the patient in counseling and coordinating care. Follow Up: No follow-ups on file.      Seen by:  Diane Vale, DO

## 2022-12-07 ENCOUNTER — TELEPHONE (OUTPATIENT)
Dept: PRIMARY CARE CLINIC | Age: 67
End: 2022-12-07

## 2022-12-07 DIAGNOSIS — M51.16 HERNIATION OF LUMBAR INTERVERTEBRAL DISC WITH RADICULOPATHY: ICD-10-CM

## 2022-12-07 DIAGNOSIS — M53.3 SACRO-ILIAC PAIN: Primary | ICD-10-CM

## 2022-12-07 NOTE — TELEPHONE ENCOUNTER
Notify x-ray shows some mild arthritis in the low back. Hips are okay. The right sacroiliac joint is probably the source of her pain. Significant arthritis in that area. I put a referral in for a back specialist at [facility].   Mail copy of the x-rays to the patient for her to take with

## 2022-12-15 ENCOUNTER — COMMUNITY OUTREACH (OUTPATIENT)
Dept: PRIMARY CARE CLINIC | Age: 67
End: 2022-12-15

## 2023-01-23 DIAGNOSIS — F41.9 ANXIETY: Chronic | ICD-10-CM

## 2023-01-23 RX ORDER — CITALOPRAM 10 MG/1
10 TABLET ORAL DAILY
Qty: 90 TABLET | Refills: 5 | Status: SHIPPED
Start: 2023-01-23 | End: 2023-03-13

## 2023-01-31 DIAGNOSIS — F41.9 ANXIETY: ICD-10-CM

## 2023-01-31 DIAGNOSIS — I10 ESSENTIAL HYPERTENSION: ICD-10-CM

## 2023-01-31 RX ORDER — TRIAMTERENE AND HYDROCHLOROTHIAZIDE 37.5; 25 MG/1; MG/1
1 TABLET ORAL DAILY
Qty: 90 TABLET | Refills: 3 | Status: SHIPPED | OUTPATIENT
Start: 2023-01-31

## 2023-01-31 RX ORDER — BUPROPION HYDROCHLORIDE 300 MG/1
TABLET ORAL
Qty: 90 TABLET | Refills: 3 | Status: SHIPPED | OUTPATIENT
Start: 2023-01-31

## 2023-03-06 DIAGNOSIS — N18.31 STAGE 3A CHRONIC KIDNEY DISEASE (HCC): ICD-10-CM

## 2023-03-06 DIAGNOSIS — I10 ESSENTIAL HYPERTENSION: Chronic | ICD-10-CM

## 2023-03-06 DIAGNOSIS — E78.2 MIXED HYPERLIPIDEMIA: Chronic | ICD-10-CM

## 2023-03-06 LAB
ALBUMIN SERPL-MCNC: 4.1 G/DL (ref 3.5–5.2)
ALP BLD-CCNC: 89 U/L (ref 35–104)
ALT SERPL-CCNC: 27 U/L (ref 0–32)
ANION GAP SERPL CALCULATED.3IONS-SCNC: 12 MMOL/L (ref 7–16)
AST SERPL-CCNC: 28 U/L (ref 0–31)
BASOPHILS ABSOLUTE: 0.06 E9/L (ref 0–0.2)
BASOPHILS RELATIVE PERCENT: 1.3 % (ref 0–2)
BILIRUB SERPL-MCNC: 0.8 MG/DL (ref 0–1.2)
BUN BLDV-MCNC: 18 MG/DL (ref 6–23)
CALCIUM SERPL-MCNC: 9.3 MG/DL (ref 8.6–10.2)
CHLORIDE BLD-SCNC: 105 MMOL/L (ref 98–107)
CHOLESTEROL, TOTAL: 204 MG/DL (ref 0–199)
CO2: 26 MMOL/L (ref 22–29)
CREAT SERPL-MCNC: 1.1 MG/DL (ref 0.5–1)
EOSINOPHILS ABSOLUTE: 0.16 E9/L (ref 0.05–0.5)
EOSINOPHILS RELATIVE PERCENT: 3.4 % (ref 0–6)
GFR SERPL CREATININE-BSD FRML MDRD: 55 ML/MIN/1.73
GLUCOSE BLD-MCNC: 90 MG/DL (ref 74–99)
HCT VFR BLD CALC: 38.2 % (ref 34–48)
HDLC SERPL-MCNC: 65 MG/DL
HEMOGLOBIN: 12.9 G/DL (ref 11.5–15.5)
IMMATURE GRANULOCYTES #: 0.01 E9/L
IMMATURE GRANULOCYTES %: 0.2 % (ref 0–5)
LDL CHOLESTEROL CALCULATED: 115 MG/DL (ref 0–99)
LYMPHOCYTES ABSOLUTE: 1.42 E9/L (ref 1.5–4)
LYMPHOCYTES RELATIVE PERCENT: 29.9 % (ref 20–42)
MCH RBC QN AUTO: 29.5 PG (ref 26–35)
MCHC RBC AUTO-ENTMCNC: 33.8 % (ref 32–34.5)
MCV RBC AUTO: 87.4 FL (ref 80–99.9)
MONOCYTES ABSOLUTE: 0.4 E9/L (ref 0.1–0.95)
MONOCYTES RELATIVE PERCENT: 8.4 % (ref 2–12)
NEUTROPHILS ABSOLUTE: 2.7 E9/L (ref 1.8–7.3)
NEUTROPHILS RELATIVE PERCENT: 56.8 % (ref 43–80)
PDW BLD-RTO: 13.4 FL (ref 11.5–15)
PLATELET # BLD: 283 E9/L (ref 130–450)
PMV BLD AUTO: 10.6 FL (ref 7–12)
POTASSIUM SERPL-SCNC: 3.9 MMOL/L (ref 3.5–5)
RBC # BLD: 4.37 E12/L (ref 3.5–5.5)
SODIUM BLD-SCNC: 143 MMOL/L (ref 132–146)
TOTAL PROTEIN: 6.6 G/DL (ref 6.4–8.3)
TRIGL SERPL-MCNC: 121 MG/DL (ref 0–149)
VLDLC SERPL CALC-MCNC: 24 MG/DL
WBC # BLD: 4.8 E9/L (ref 4.5–11.5)

## 2023-03-13 ENCOUNTER — OFFICE VISIT (OUTPATIENT)
Dept: PRIMARY CARE CLINIC | Age: 68
End: 2023-03-13
Payer: MEDICARE

## 2023-03-13 VITALS
HEIGHT: 60 IN | DIASTOLIC BLOOD PRESSURE: 82 MMHG | WEIGHT: 149 LBS | SYSTOLIC BLOOD PRESSURE: 135 MMHG | TEMPERATURE: 98 F | BODY MASS INDEX: 29.25 KG/M2

## 2023-03-13 DIAGNOSIS — M81.0 AGE-RELATED OSTEOPOROSIS WITHOUT CURRENT PATHOLOGICAL FRACTURE: ICD-10-CM

## 2023-03-13 DIAGNOSIS — M15.9 PRIMARY OSTEOARTHRITIS INVOLVING MULTIPLE JOINTS: Chronic | ICD-10-CM

## 2023-03-13 DIAGNOSIS — E11.9 DIET-CONTROLLED DIABETES MELLITUS (HCC): ICD-10-CM

## 2023-03-13 DIAGNOSIS — N18.31 STAGE 3A CHRONIC KIDNEY DISEASE (HCC): ICD-10-CM

## 2023-03-13 DIAGNOSIS — F41.9 ANXIETY: Chronic | ICD-10-CM

## 2023-03-13 DIAGNOSIS — Z00.00 MEDICARE ANNUAL WELLNESS VISIT, SUBSEQUENT: Primary | ICD-10-CM

## 2023-03-13 DIAGNOSIS — E78.2 MIXED HYPERLIPIDEMIA: ICD-10-CM

## 2023-03-13 PROCEDURE — 3079F DIAST BP 80-89 MM HG: CPT | Performed by: FAMILY MEDICINE

## 2023-03-13 PROCEDURE — 3075F SYST BP GE 130 - 139MM HG: CPT | Performed by: FAMILY MEDICINE

## 2023-03-13 PROCEDURE — G0439 PPPS, SUBSEQ VISIT: HCPCS | Performed by: FAMILY MEDICINE

## 2023-03-13 PROCEDURE — 1123F ACP DISCUSS/DSCN MKR DOCD: CPT | Performed by: FAMILY MEDICINE

## 2023-03-13 RX ORDER — PRAVASTATIN SODIUM 40 MG
40 TABLET ORAL DAILY
Qty: 90 TABLET | Refills: 5 | Status: SHIPPED | OUTPATIENT
Start: 2023-03-13

## 2023-03-13 ASSESSMENT — PATIENT HEALTH QUESTIONNAIRE - PHQ9
2. FEELING DOWN, DEPRESSED OR HOPELESS: 0
SUM OF ALL RESPONSES TO PHQ QUESTIONS 1-9: 0
1. LITTLE INTEREST OR PLEASURE IN DOING THINGS: 0
SUM OF ALL RESPONSES TO PHQ9 QUESTIONS 1 & 2: 0
SUM OF ALL RESPONSES TO PHQ QUESTIONS 1-9: 0

## 2023-03-13 ASSESSMENT — LIFESTYLE VARIABLES
HOW OFTEN DO YOU HAVE A DRINK CONTAINING ALCOHOL: NEVER
HOW MANY STANDARD DRINKS CONTAINING ALCOHOL DO YOU HAVE ON A TYPICAL DAY: PATIENT DOES NOT DRINK

## 2023-03-13 NOTE — PATIENT INSTRUCTIONS
Advance Directives: Care Instructions  Overview  An advance directive is a legal way to state your wishes at the end of your life. It tells your family and your doctor what to do if you can't say what you want. There are two main types of advance directives. You can change them any time your wishes change. Living will. This form tells your family and your doctor your wishes about life support and other treatment. The form is also called a declaration. Medical power of . This form lets you name a person to make treatment decisions for you when you can't speak for yourself. This person is called a health care agent (health care proxy, health care surrogate). The form is also called a durable power of  for health care. If you do not have an advance directive, decisions about your medical care may be made by a family member, or by a doctor or a  who doesn't know you. It may help to think of an advance directive as a gift to the people who care for you. If you have one, they won't have to make tough decisions by themselves. For more information, including forms for your state, see the [address] website (www.caringinfo.org/planning/advance-directives/). Follow-up care is a key part of your treatment and safety. Be sure to make and go to all appointments, and call your doctor if you are having problems. It's also a good idea to know your test results and keep a list of the medicines you take. What should you include in an advance directive? Many states have a unique advance directive form. (It may ask you to address specific issues.) Or you might use a universal form that's approved by many states. If your form doesn't tell you what to address, it may be hard to know what to include in your advance directive. Use the questions below to help you get started. Who do you want to make decisions about your medical care if you are not able to?   What life-support measures do you want if you have a serious illness that gets worse over time or can't be cured? What are you most afraid of that might happen? (Maybe you're afraid of having pain, losing your independence, or being kept alive by machines.)  Where would you prefer to die? (Your home? A hospital? A nursing home?)  Do you want to donate your organs when you die? Do you want certain Latter-day practices performed before you die? When should you call for help? Be sure to contact your doctor if you have any questions. Where can you learn more? Go to http://www.escobar.com/ and enter R264 to learn more about \"Advance Directives: Care Instructions. \"  Current as of: June 16, 2022               Content Version: 13.5  © 2006-2022 [employer]. Care instructions adapted under license by [facility] ([facility]). If you have questions about a medical condition or this instruction, always ask your healthcare professional. [place] [address] any warranty or liability for your use of this information. A Healthy Heart: Care Instructions  Your Care Instructions     Coronary artery disease, also called heart disease, occurs when a substance called plaque builds up in the vessels that supply oxygen-rich blood to your heart muscle. This can narrow the blood vessels and reduce blood flow. A heart attack happens when blood flow is completely blocked. A high-fat diet, smoking, and other factors increase the risk of heart disease. Your doctor has found that you have a chance of having heart disease. You can do lots of things to keep your heart healthy. It may not be easy, but you can change your diet, exercise more, and quit smoking. These steps really work to lower your chance of heart disease. Follow-up care is a key part of your treatment and safety. Be sure to make and go to all appointments, and call your doctor if you are having problems.  It's also a good idea to know your test results and keep a list of the medicines you take. How can you care for yourself at home? Diet    Use less salt when you cook and eat. This helps lower your blood pressure. Taste food before salting. Add only a little salt when you think you need it. With time, your taste buds will adjust to less salt.     Eat fewer snack items, fast foods, canned soups, and other high-salt, high-fat, processed foods.     Read food labels and try to avoid saturated and trans fats. They increase your risk of heart disease by raising cholesterol levels.     Limit the amount of solid fat-butter, margarine, and shortening-you eat. Use olive, peanut, or canola oil when you cook. Bake, broil, and steam foods instead of frying them.     Eat a variety of fruit and vegetables every day. Dark green, deep orange, red, or yellow fruits and vegetables are especially good for you. Examples include spinach, carrots, peaches, and berries.     Foods high in fiber can reduce your cholesterol and provide important vitamins and minerals. High-fiber foods include whole-grain cereals and breads, oatmeal, beans, brown rice, citrus fruits, and apples.     Eat lean proteins. Heart-healthy proteins include seafood, lean meats and poultry, eggs, beans, peas, nuts, seeds, and soy products.     Limit drinks and foods with added sugar. These include candy, desserts, and soda pop. Lifestyle changes    If your doctor recommends it, get more exercise. Walking is a good choice. Bit by bit, increase the amount you walk every day. Try for at least 30 minutes on most days of the week. You also may want to swim, bike, or do other activities.     Do not smoke. If you need help quitting, talk to your doctor about stop-smoking programs and medicines. These can increase your chances of quitting for good. Quitting smoking may be the most important step you can take to protect your heart. It is never too late to quit.     Limit alcohol to 2 drinks a day for men and 1 drink a day for women.  Too much alcohol can cause health problems.     Manage other health problems such as diabetes, high blood pressure, and high cholesterol. If you think you may have a problem with alcohol or drug use, talk to your doctor. Medicines    Take your medicines exactly as prescribed. Call your doctor if you think you are having a problem with your medicine.     If your doctor recommends aspirin, take the amount directed each day. Make sure you take aspirin and not another kind of pain reliever, such as acetaminophen (Tylenol). When should you call for help? Call 911 if you have symptoms of a heart attack. These may include:    Chest pain or pressure, or a strange feeling in the chest.     Sweating.     Shortness of breath.     Pain, pressure, or a strange feeling in the back, neck, jaw, or upper belly or in one or both shoulders or arms.     Lightheadedness or sudden weakness.     A fast or irregular heartbeat. After you call 911, the  may tell you to chew 1 adult-strength or 2 to 4 low-dose aspirin. Wait for an ambulance. Do not try to drive yourself. Watch closely for changes in your health, and be sure to contact your doctor if you have any problems. Where can you learn more? Go to http://www.escobar.com/ and enter F075 to learn more about \"A Healthy Heart: Care Instructions. \"  Current as of: September 7, 2022               Content Version: 13.5  © 1796-2050 [employer]. Care instructions adapted under license by [facility] ([facility]). If you have questions about a medical condition or this instruction, always ask your healthcare professional. [place] [address] any warranty or liability for your use of this information. Personalized Preventive Plan for Reagan Mahajan - 3/13/2023  Medicare offers a range of preventive health benefits. Some of the tests and screenings are paid in full while other may be subject to a deductible, co-insurance, and/or copay.     Some of these benefits include a comprehensive review of your medical history including lifestyle, illnesses that may run in your family, and various assessments and screenings as appropriate. After reviewing your medical record and screening and assessments performed today your provider may have ordered immunizations, labs, imaging, and/or referrals for you. A list of these orders (if applicable) as well as your Preventive Care list are included within your After Visit Summary for your review. Other Preventive Recommendations:    A preventive eye exam performed by an eye specialist is recommended every 1-2 years to screen for glaucoma; cataracts, macular degeneration, and other eye disorders. A preventive dental visit is recommended every 6 months. Try to get at least 150 minutes of exercise per week or 10,000 steps per day on a pedometer . Order or download the FREE \"Exercise & Physical Activity: Your Everyday Guide\" from [employer]. Call 7-513.296.9625 or search [employer] online. You need 8487-9496 mg of calcium and 1323-7377 IU of vitamin D per day. It is possible to meet your calcium requirement with diet alone, but a vitamin D supplement is usually necessary to meet this goal.  When exposed to the sun, use a sunscreen that protects against both UVA and UVB radiation with an SPF of 30 or greater. Reapply every 2 to 3 hours or after sweating, drying off with a towel, or swimming. Always wear a seat belt when traveling in a car. Always wear a helmet when riding a bicycle or motorcycle.

## 2023-03-13 NOTE — PROGRESS NOTES
Medicare Annual Wellness Visit    Jacey Medina is here for Medicare AWV    Assessment & Plan   Medicare annual wellness visit, subsequent  Diet-controlled diabetes mellitus ([address]  -     CBC with Auto Differential; Future  -     Comprehensive Metabolic Panel; Future  -     Hemoglobin A1C; Future  -     Lipid Panel; Future  -     Urinalysis; Future  -     Vitamin D 25 Hydroxy; Future  Stage 3a chronic kidney disease (HCC)  -     CBC with Auto Differential; Future  -     Comprehensive Metabolic Panel; Future  -     Hemoglobin A1C; Future  -     Lipid Panel; Future  -     Urinalysis; Future  -     Vitamin D 25 Hydroxy; Future  Anxiety  Primary osteoarthritis involving multiple joints  Mixed hyperlipidemia  -     pravastatin (PRAVACHOL) 40 MG tablet; Take 1 tablet by mouth daily No food, Disp-90 tablet, R-5Normal  -     CBC with Auto Differential; Future  -     Comprehensive Metabolic Panel; Future  -     Hemoglobin A1C; Future  -     Lipid Panel; Future  -     Urinalysis; Future  -     Vitamin D 25 Hydroxy; Future  Age-related osteoporosis without current pathological fracture  -     Vitamin D 25 Hydroxy; Future    Recommendations for Preventive Services Due: see orders and patient instructions/AVS.  Recommended screening schedule for the next 5-10 years is provided to the patient in written form: see Patient Instructions/AVS.     Return in 6 months (on 9/13/2023) for Lab Before. Subjective   The following acute and/or chronic problems were also addressed today:    Chol anx  bp     vit d  arht  back pain  stress    Feelok stress with  BF   cva     alb   crea stable   chol better  wt same  off celexa      Patient's complete Health Risk Assessment and screening values have been reviewed and are found in Flowsheets. The following problems were reviewed today and where indicated follow up appointments were made and/or referrals ordered.     Positive Risk Factor Screenings with Interventions:                 Weight and Activity:  Physical Activity: Inactive    Days of Exercise per Week: 0 days    Minutes of Exercise per Session: 0 min     On average, how many days per week do you engage in moderate to strenuous exercise (like a brisk walk)?: 0 days  Have you lost any weight without trying in the past 3 months?: No  Body mass index: (!) 29.1    Inactivity Interventions:  Patient declined any further interventions or treatment                           Objective   Vitals:    03/13/23 1117   BP: 135/82   Temp: 98 °F (36.7 °C)   TempSrc: Oral   Weight: 149 lb (67.6 kg)   Height: 5' (1.524 m)      Body mass index is 29.1 kg/m². General Appearance: alert and oriented to person, place and time, well developed and well- nourished, in no acute distress  Skin: warm and dry, no rash or erythema  Head: normocephalic and atraumatic  Eyes: pupils equal, round, and reactive to light, extraocular eye movements intact, conjunctivae normal  ENT: tympanic membrane, external ear and ear canal normal bilaterally, nose without deformity, nasal mucosa and turbinates normal without polyps  Neck: supple and non-tender without mass, no thyromegaly or thyroid nodules, no cervical lymphadenopathy  Pulmonary/Chest: clear to auscultation bilaterally- no wheezes, rales or rhonchi, normal air movement, no respiratory distress  Cardiovascular: normal rate, regular rhythm, normal S1 and S2, no murmurs, rubs, clicks, or gallops, distal pulses intact, no carotid bruits  Abdomen: soft, non-tender, non-distended, normal bowel sounds, no masses or organomegaly  Extremities: no cyanosis, clubbing or edema  Musculoskeletal: normal range of motion, no joint swelling, deformity or tenderness  Neurologic: reflexes normal and symmetric, no cranial nerve deficit, gait, coordination and speech normal       No Known Allergies  Prior to Visit Medications    Medication Sig Taking?  Authorizing Provider   pravastatin (PRAVACHOL) 40 MG tablet Take 1 tablet by mouth daily No food Yes Sebastián Owens DO   triamterene-hydroCHLOROthiazide (MAXZIDE-25) 37.5-25 MG per tablet Take 1 tablet by mouth daily  Sebastián Owens DO   buPROPion (WELLBUTRIN XL) 300 MG extended release tablet TAKE 1 TABLET BY MOUTH EVERY DAY IN THE MORNING  Sebastián Owens DO   vitamin D (ERGOCALCIFEROL) 1.25 MG (65013 UT) CAPS capsule One weekly for 12 weeks then vit d 3  2000 daily  Sebastián Owens DO   RESTASIS 0.05 % ophthalmic emulsion INSTILL 1 DROP INTO BOTH EYES EVERY 12 HOURS  Historical Provider, MD   acyclovir (ZOVIRAX) 5 % CREA   Historical Provider, MD Fall (Including outside providers/suppliers regularly involved in providing care):   Patient Care Team:  Jose L Nava DO as PCP - General (Family Medicine)  Jose L Nava DO as PCP - Empaneled Provider     Reviewed and updated this visit:  Tobacco  Allergies  Med Hx  Surg Hx  Soc Hx  Fam Hx         Die exer inc pracvhol  40 ,g   diet exer        I educated the patient about all medications. Make sure they were correct and educated  on the risk associated with missing meds or taking them incorrectly. A list of medications is being sent home with patient today. Check blood pressure at home twice a day. Low-salt low caffeine diet. Call if systolic blood pressure is above 175 and diastolic blood pressures above 85. Only use a upper arm digital cuff. Aggressive low-fat diet. Avoid red meats, greasy fried foods, dairy products. Avoid processed foods. Take cholesterol medications without food. A great deal of time spent reviewing medications, diet, exercise, social issues. Also reviewing the chart before entering the room with patient and finishing charting after leaving patient's room. More than half of that time was spent face to face with the patient in counseling and coordinating care. I informed patient about the risk associated with noncompliance of medication and taking medications incorrectly. Appropriate follow-up with myself and all specialist.  Encourage family members to take active role in assisting with medications and medical care.   If any confusion should develop to notify my office immediately to avoid risk of worsening medical condition      Diane Burger [address], DO

## 2023-05-22 ENCOUNTER — OFFICE VISIT (OUTPATIENT)
Dept: FAMILY MEDICINE CLINIC | Age: 68
End: 2023-05-22
Payer: MEDICARE

## 2023-05-22 VITALS
TEMPERATURE: 97.5 F | BODY MASS INDEX: 27.19 KG/M2 | RESPIRATION RATE: 20 BRPM | HEIGHT: 61 IN | OXYGEN SATURATION: 98 % | DIASTOLIC BLOOD PRESSURE: 88 MMHG | SYSTOLIC BLOOD PRESSURE: 122 MMHG | HEART RATE: 88 BPM | WEIGHT: 144 LBS

## 2023-05-22 DIAGNOSIS — K52.9 ACUTE GASTROENTERITIS: Primary | ICD-10-CM

## 2023-05-22 PROCEDURE — 99213 OFFICE O/P EST LOW 20 MIN: CPT

## 2023-05-22 PROCEDURE — 1123F ACP DISCUSS/DSCN MKR DOCD: CPT

## 2023-05-22 PROCEDURE — 3079F DIAST BP 80-89 MM HG: CPT

## 2023-05-22 PROCEDURE — 3074F SYST BP LT 130 MM HG: CPT

## 2023-05-22 RX ORDER — LOPERAMIDE HYDROCHLORIDE 2 MG/1
2 CAPSULE ORAL 4 TIMES DAILY PRN
Qty: 40 CAPSULE | Refills: 0 | Status: SHIPPED | OUTPATIENT
Start: 2023-05-22 | End: 2023-06-01

## 2023-05-22 RX ORDER — DICYCLOMINE HYDROCHLORIDE 10 MG/1
10 CAPSULE ORAL 4 TIMES DAILY
Qty: 120 CAPSULE | Refills: 0 | Status: SHIPPED | OUTPATIENT
Start: 2023-05-22

## 2023-05-22 ASSESSMENT — ENCOUNTER SYMPTOMS
SORE THROAT: 0
CHEST TIGHTNESS: 0
ABDOMINAL PAIN: 0
VOMITING: 1
DIARRHEA: 1
SHORTNESS OF BREATH: 0
COUGH: 0
NAUSEA: 1

## 2023-05-30 ENCOUNTER — OFFICE VISIT (OUTPATIENT)
Dept: PRIMARY CARE CLINIC | Age: 68
End: 2023-05-30
Payer: MEDICARE

## 2023-05-30 VITALS
HEIGHT: 61 IN | WEIGHT: 142.6 LBS | BODY MASS INDEX: 26.92 KG/M2 | DIASTOLIC BLOOD PRESSURE: 78 MMHG | SYSTOLIC BLOOD PRESSURE: 128 MMHG | TEMPERATURE: 97.6 F

## 2023-05-30 DIAGNOSIS — I10 ESSENTIAL HYPERTENSION: Chronic | ICD-10-CM

## 2023-05-30 DIAGNOSIS — E86.0 DEHYDRATION: ICD-10-CM

## 2023-05-30 DIAGNOSIS — R11.14 BILIOUS VOMITING WITH NAUSEA: ICD-10-CM

## 2023-05-30 DIAGNOSIS — N18.31 STAGE 3A CHRONIC KIDNEY DISEASE (HCC): ICD-10-CM

## 2023-05-30 DIAGNOSIS — R11.14 BILIOUS VOMITING WITH NAUSEA: Primary | ICD-10-CM

## 2023-05-30 LAB
ALBUMIN SERPL-MCNC: 4.6 G/DL (ref 3.5–5.2)
ALP SERPL-CCNC: 104 U/L (ref 35–104)
ALT SERPL-CCNC: 30 U/L (ref 0–32)
ANION GAP SERPL CALCULATED.3IONS-SCNC: 15 MMOL/L (ref 7–16)
AST SERPL-CCNC: 28 U/L (ref 0–31)
BACTERIA URNS QL MICRO: ABNORMAL /HPF
BASOPHILS # BLD: 0.02 E9/L (ref 0–0.2)
BASOPHILS NFR BLD: 0.3 % (ref 0–2)
BILIRUB SERPL-MCNC: 1 MG/DL (ref 0–1.2)
BILIRUB UR QL STRIP: NEGATIVE
BUN SERPL-MCNC: 12 MG/DL (ref 6–23)
CALCIUM SERPL-MCNC: 9.6 MG/DL (ref 8.6–10.2)
CHLORIDE SERPL-SCNC: 100 MMOL/L (ref 98–107)
CLARITY UR: CLEAR
CO2 SERPL-SCNC: 27 MMOL/L (ref 22–29)
COLOR UR: YELLOW
CREAT SERPL-MCNC: 1.1 MG/DL (ref 0.5–1)
EOSINOPHIL # BLD: 0.13 E9/L (ref 0.05–0.5)
EOSINOPHIL NFR BLD: 2 % (ref 0–6)
EPI CELLS #/AREA URNS HPF: ABNORMAL /HPF
ERYTHROCYTE [DISTWIDTH] IN BLOOD BY AUTOMATED COUNT: 12.9 FL (ref 11.5–15)
GLUCOSE SERPL-MCNC: 101 MG/DL (ref 74–99)
GLUCOSE UR STRIP-MCNC: NEGATIVE MG/DL
HCT VFR BLD AUTO: 41.8 % (ref 34–48)
HGB BLD-MCNC: 14 G/DL (ref 11.5–15.5)
HGB UR QL STRIP: NEGATIVE
IMM GRANULOCYTES # BLD: 0.02 E9/L
IMM GRANULOCYTES NFR BLD: 0.3 % (ref 0–5)
KETONES UR STRIP-MCNC: ABNORMAL MG/DL
LEUKOCYTE ESTERASE UR QL STRIP: ABNORMAL
LIPASE: 13 U/L (ref 13–60)
LYMPHOCYTES # BLD: 1.44 E9/L (ref 1.5–4)
LYMPHOCYTES NFR BLD: 22.3 % (ref 20–42)
MCH RBC QN AUTO: 29.7 PG (ref 26–35)
MCHC RBC AUTO-ENTMCNC: 33.5 % (ref 32–34.5)
MCV RBC AUTO: 88.7 FL (ref 80–99.9)
MONOCYTES # BLD: 0.59 E9/L (ref 0.1–0.95)
MONOCYTES NFR BLD: 9.1 % (ref 2–12)
NEUTROPHILS # BLD: 4.27 E9/L (ref 1.8–7.3)
NEUTS SEG NFR BLD: 66 % (ref 43–80)
NITRITE UR QL STRIP: NEGATIVE
PH UR STRIP: 7 [PH] (ref 5–9)
PLATELET # BLD AUTO: 346 E9/L (ref 130–450)
PMV BLD AUTO: 10.5 FL (ref 7–12)
POTASSIUM SERPL-SCNC: 3.4 MMOL/L (ref 3.5–5)
PROT SERPL-MCNC: 7 G/DL (ref 6.4–8.3)
PROT UR STRIP-MCNC: NEGATIVE MG/DL
RBC # BLD AUTO: 4.71 E12/L (ref 3.5–5.5)
RBC #/AREA URNS HPF: ABNORMAL /HPF (ref 0–2)
SODIUM SERPL-SCNC: 142 MMOL/L (ref 132–146)
SP GR UR STRIP: 1.01 (ref 1–1.03)
UROBILINOGEN UR STRIP-ACNC: 0.2 E.U./DL
WBC # BLD: 6.5 E9/L (ref 4.5–11.5)
WBC #/AREA URNS HPF: ABNORMAL /HPF (ref 0–5)

## 2023-05-30 PROCEDURE — 1123F ACP DISCUSS/DSCN MKR DOCD: CPT | Performed by: FAMILY MEDICINE

## 2023-05-30 PROCEDURE — 3078F DIAST BP <80 MM HG: CPT | Performed by: FAMILY MEDICINE

## 2023-05-30 PROCEDURE — 99214 OFFICE O/P EST MOD 30 MIN: CPT | Performed by: FAMILY MEDICINE

## 2023-05-30 PROCEDURE — 3074F SYST BP LT 130 MM HG: CPT | Performed by: FAMILY MEDICINE

## 2023-05-30 RX ORDER — ONDANSETRON 4 MG/1
4 TABLET, FILM COATED ORAL 3 TIMES DAILY PRN
Qty: 60 TABLET | Refills: 2 | Status: SHIPPED | OUTPATIENT
Start: 2023-05-30

## 2023-05-30 ASSESSMENT — PATIENT HEALTH QUESTIONNAIRE - PHQ9
SUM OF ALL RESPONSES TO PHQ QUESTIONS 1-9: 0
SUM OF ALL RESPONSES TO PHQ QUESTIONS 1-9: 0
SUM OF ALL RESPONSES TO PHQ9 QUESTIONS 1 & 2: 0
1. LITTLE INTEREST OR PLEASURE IN DOING THINGS: 0
2. FEELING DOWN, DEPRESSED OR HOPELESS: 0
SUM OF ALL RESPONSES TO PHQ QUESTIONS 1-9: 0
SUM OF ALL RESPONSES TO PHQ QUESTIONS 1-9: 0

## 2023-05-30 ASSESSMENT — ENCOUNTER SYMPTOMS
ABDOMINAL PAIN: 0
ALLERGIC/IMMUNOLOGIC NEGATIVE: 1
EYES NEGATIVE: 1
RESPIRATORY NEGATIVE: 1
NAUSEA: 1
CONSTIPATION: 0
DIARRHEA: 0
ABDOMINAL DISTENTION: 0
BLOOD IN STOOL: 0

## 2023-05-30 NOTE — PROGRESS NOTES
23  Name: Sia Sarmiento    : 1955    Sex: female    Age: 78 y.o. Subjective:  Chief Complaint: Patient is here for [facility]      To [facility]  last week   gave     diar med and  federico      she  no take  bently     pt states no abd camrps  main co nwo is nausea  Nausea  occurs    at diff tiems   worse witheating at [employer] [facility] down   7  lbs  Stress with  BF      Review of Systems   Constitutional: Negative. HENT: Negative. Eyes: Negative. Respiratory: Negative. Cardiovascular: Negative. Gastrointestinal:  Positive for nausea. Negative for abdominal distention, abdominal pain, blood in stool, constipation and diarrhea. Endocrine: Negative. Genitourinary: Negative. Musculoskeletal: Negative. Skin: Negative. Allergic/Immunologic: Negative. Neurological: Negative. Hematological: Negative. Psychiatric/Behavioral: Negative.          Current Outpatient Medications:     ondansetron (ZOFRAN) 4 MG tablet, Take 1 tablet by mouth 3 times daily as needed for Nausea or Vomiting, Disp: 60 tablet, Rfl: 2    pravastatin (PRAVACHOL) 40 MG tablet, Take 1 tablet by mouth daily No food, Disp: 90 tablet, Rfl: 5    triamterene-hydroCHLOROthiazide (MAXZIDE-25) 37.5-25 MG per tablet, Take 1 tablet by mouth daily, Disp: 90 tablet, Rfl: 3    buPROPion (WELLBUTRIN XL) 300 MG extended release tablet, TAKE 1 TABLET BY MOUTH EVERY DAY IN THE MORNING, Disp: 90 tablet, Rfl: 3    vitamin D (ERGOCALCIFEROL) 1.25 MG (62558 UT) CAPS capsule, One weekly for 12 weeks then vit d 3  2000 daily, Disp: 12 capsule, Rfl: 0    RESTASIS 0.05 % ophthalmic emulsion, INSTILL 1 DROP INTO BOTH EYES EVERY 12 HOURS, Disp: , Rfl: 11    acyclovir (ZOVIRAX) 5 % CREA, , Disp: , Rfl:   No Known Allergies  Social History     Socioeconomic History    Marital status:      Spouse name: Not on file    Number of children: Not on file    Years of education: Not on file    Highest education level: Not on file   Occupational

## 2023-05-31 ENCOUNTER — TELEPHONE (OUTPATIENT)
Dept: PRIMARY CARE CLINIC | Age: 68
End: 2023-05-31

## 2023-05-31 NOTE — TELEPHONE ENCOUNTER
Notify patient lab is good her potassium was just low by 1/10 of a point. Eat a banana twice a day for 1 week. If her nausea does not resolve soon let me know.   I put the referral in for the gastroenterologist.

## 2023-06-02 ENCOUNTER — HOSPITAL ENCOUNTER (EMERGENCY)
Age: 68
Discharge: HOME | End: 2023-06-02
Attending: EMERGENCY MEDICINE
Payer: MEDICARE

## 2023-06-02 ENCOUNTER — APPOINTMENT (OUTPATIENT)
Dept: CT IMAGING | Age: 68
End: 2023-06-02
Payer: MEDICARE

## 2023-06-02 VITALS
SYSTOLIC BLOOD PRESSURE: 114 MMHG | DIASTOLIC BLOOD PRESSURE: 69 MMHG | OXYGEN SATURATION: 96 % | RESPIRATION RATE: 16 BRPM | BODY MASS INDEX: 27.38 KG/M2 | HEIGHT: 61 IN | TEMPERATURE: 98.3 F | WEIGHT: 145 LBS | HEART RATE: 80 BPM

## 2023-06-02 DIAGNOSIS — R19.7 DIARRHEA, UNSPECIFIED TYPE: Primary | ICD-10-CM

## 2023-06-02 DIAGNOSIS — E86.0 DEHYDRATION: ICD-10-CM

## 2023-06-02 LAB
ALBUMIN SERPL-MCNC: 4.5 G/DL (ref 3.5–5.2)
ALP SERPL-CCNC: 96 U/L (ref 35–104)
ALT SERPL-CCNC: 24 U/L (ref 0–32)
ANION GAP SERPL CALCULATED.3IONS-SCNC: 13 MMOL/L (ref 7–16)
AST SERPL-CCNC: 19 U/L (ref 0–31)
BASOPHILS # BLD: 0.02 E9/L (ref 0–0.2)
BASOPHILS NFR BLD: 0.3 % (ref 0–2)
BILIRUB DIRECT SERPL-MCNC: 0.2 MG/DL (ref 0–0.3)
BILIRUB INDIRECT SERPL-MCNC: 0.8 MG/DL (ref 0–1)
BILIRUB SERPL-MCNC: 1 MG/DL (ref 0–1.2)
BUN SERPL-MCNC: 9 MG/DL (ref 6–23)
CALCIUM SERPL-MCNC: 9.6 MG/DL (ref 8.6–10.2)
CHLORIDE SERPL-SCNC: 101 MMOL/L (ref 98–107)
CO2 SERPL-SCNC: 25 MMOL/L (ref 22–29)
CREAT SERPL-MCNC: 1.2 MG/DL (ref 0.5–1)
EOSINOPHIL # BLD: 0.04 E9/L (ref 0.05–0.5)
EOSINOPHIL NFR BLD: 0.5 % (ref 0–6)
ERYTHROCYTE [DISTWIDTH] IN BLOOD BY AUTOMATED COUNT: 13.2 FL (ref 11.5–15)
GLUCOSE SERPL-MCNC: 113 MG/DL (ref 74–99)
HCT VFR BLD AUTO: 40.5 % (ref 34–48)
HGB BLD-MCNC: 14 G/DL (ref 11.5–15.5)
IMM GRANULOCYTES # BLD: 0.02 E9/L
IMM GRANULOCYTES NFR BLD: 0.3 % (ref 0–5)
LIPASE: 17 U/L (ref 13–60)
LYMPHOCYTES # BLD: 0.67 E9/L (ref 1.5–4)
LYMPHOCYTES NFR BLD: 8.7 % (ref 20–42)
MCH RBC QN AUTO: 30.2 PG (ref 26–35)
MCHC RBC AUTO-ENTMCNC: 34.6 % (ref 32–34.5)
MCV RBC AUTO: 87.3 FL (ref 80–99.9)
MONOCYTES # BLD: 0.5 E9/L (ref 0.1–0.95)
MONOCYTES NFR BLD: 6.5 % (ref 2–12)
NEUTROPHILS # BLD: 6.43 E9/L (ref 1.8–7.3)
NEUTS SEG NFR BLD: 83.7 % (ref 43–80)
PLATELET # BLD AUTO: 310 E9/L (ref 130–450)
PMV BLD AUTO: 9.8 FL (ref 7–12)
POTASSIUM SERPL-SCNC: 3.3 MMOL/L (ref 3.5–5)
PROT SERPL-MCNC: 6.8 G/DL (ref 6.4–8.3)
RBC # BLD AUTO: 4.64 E12/L (ref 3.5–5.5)
SODIUM SERPL-SCNC: 139 MMOL/L (ref 132–146)
WBC # BLD: 7.7 E9/L (ref 4.5–11.5)

## 2023-06-02 PROCEDURE — 6360000002 HC RX W HCPCS: Performed by: EMERGENCY MEDICINE

## 2023-06-02 PROCEDURE — 2580000003 HC RX 258: Performed by: EMERGENCY MEDICINE

## 2023-06-02 PROCEDURE — 96360 HYDRATION IV INFUSION INIT: CPT

## 2023-06-02 PROCEDURE — 87449 NOS EACH ORGANISM AG IA: CPT

## 2023-06-02 PROCEDURE — 80076 HEPATIC FUNCTION PANEL: CPT

## 2023-06-02 PROCEDURE — 82270 OCCULT BLOOD FECES: CPT

## 2023-06-02 PROCEDURE — 83690 ASSAY OF LIPASE: CPT

## 2023-06-02 PROCEDURE — 80048 BASIC METABOLIC PNL TOTAL CA: CPT

## 2023-06-02 PROCEDURE — 87045 FECES CULTURE AEROBIC BACT: CPT

## 2023-06-02 PROCEDURE — 74176 CT ABD & PELVIS W/O CONTRAST: CPT

## 2023-06-02 PROCEDURE — 96372 THER/PROPH/DIAG INJ SC/IM: CPT

## 2023-06-02 PROCEDURE — 99284 EMERGENCY DEPT VISIT MOD MDM: CPT

## 2023-06-02 PROCEDURE — 87425 ROTAVIRUS AG IA: CPT

## 2023-06-02 PROCEDURE — 36415 COLL VENOUS BLD VENIPUNCTURE: CPT

## 2023-06-02 PROCEDURE — 87324 CLOSTRIDIUM AG IA: CPT

## 2023-06-02 PROCEDURE — 6370000000 HC RX 637 (ALT 250 FOR IP): Performed by: EMERGENCY MEDICINE

## 2023-06-02 PROCEDURE — 87329 GIARDIA AG IA: CPT

## 2023-06-02 PROCEDURE — 85025 COMPLETE CBC W/AUTO DIFF WBC: CPT

## 2023-06-02 PROCEDURE — 82705 FATS/LIPIDS FECES QUAL: CPT

## 2023-06-02 RX ORDER — 0.9 % SODIUM CHLORIDE 0.9 %
1000 INTRAVENOUS SOLUTION INTRAVENOUS ONCE
Status: COMPLETED | OUTPATIENT
Start: 2023-06-02 | End: 2023-06-02

## 2023-06-02 RX ORDER — POTASSIUM CHLORIDE 20 MEQ/1
40 TABLET, EXTENDED RELEASE ORAL ONCE
Status: COMPLETED | OUTPATIENT
Start: 2023-06-02 | End: 2023-06-02

## 2023-06-02 RX ORDER — PROMETHAZINE HYDROCHLORIDE 25 MG/1
25 TABLET ORAL 4 TIMES DAILY PRN
Qty: 20 TABLET | Refills: 0 | Status: SHIPPED | OUTPATIENT
Start: 2023-06-02 | End: 2023-06-09

## 2023-06-02 RX ORDER — PROMETHAZINE HYDROCHLORIDE 25 MG/ML
12.5 INJECTION, SOLUTION INTRAMUSCULAR; INTRAVENOUS ONCE
Status: COMPLETED | OUTPATIENT
Start: 2023-06-02 | End: 2023-06-02

## 2023-06-02 RX ADMIN — POTASSIUM CHLORIDE 40 MEQ: 1500 TABLET, EXTENDED RELEASE ORAL at 23:05

## 2023-06-02 RX ADMIN — SODIUM CHLORIDE 1000 ML: 9 INJECTION, SOLUTION INTRAVENOUS at 21:35

## 2023-06-02 RX ADMIN — PROMETHAZINE HYDROCHLORIDE 12.5 MG: 25 INJECTION INTRAMUSCULAR; INTRAVENOUS at 21:57

## 2023-06-02 ASSESSMENT — ENCOUNTER SYMPTOMS
COUGH: 0
BACK PAIN: 0
EYE PAIN: 0
SHORTNESS OF BREATH: 0
SINUS PRESSURE: 0
WHEEZING: 0
EYE REDNESS: 0
VOMITING: 1
EYE DISCHARGE: 0
SORE THROAT: 0
ABDOMINAL DISTENTION: 0
NAUSEA: 1
ABDOMINAL PAIN: 1
DIARRHEA: 1

## 2023-06-02 ASSESSMENT — PAIN - FUNCTIONAL ASSESSMENT: PAIN_FUNCTIONAL_ASSESSMENT: NONE - DENIES PAIN

## 2023-06-02 NOTE — ED NOTES
Department of Emergency Medicine  FIRST PROVIDER TRIAGE NOTE             Independent MLP           6/2/23  6:42 PM EDT    Date of Encounter: 6/2/23   MRN: [id number]      HPI: Guillermina Brandon is a 78 y.o. female who presents to the ED for Diarrhea (Diarrhea and nausea for 16 days. Denies fever/chills) and Abdominal Pain (Intermittent abd pain )  Patient for the past 16 days she has been having diarrhea and nausea as well as intermittent episodes of diffuse abdominal pain. ROS: Negative for cp or sob. PE: Gen Appearance/Constitutional: alert     Initial Plan of Care: All treatment areas with department are currently occupied. Plan to order/Initiate the following while awaiting opening in ED: .   Initiate Treatment-Testing, Proceed toTreatment Area When Bed Available for ED Attending/ALYSSA to Continue Care    Electronically signed by FERN Jeter CNP   DD: 6/2/23       FERN Jeter CNP  06/02/23 4149

## 2023-06-03 NOTE — ED NOTES
Patient had episode of incontinence, this RN provided pt with supplies to clean up and ambulated to restroom. Patient attempted to have bowel movement at this time w/out success. Notified of need for stool specimen.      Yung Sanderson RN  06/02/23 0440

## 2023-06-03 NOTE — ED NOTES
Patient ambulated to restroom, stool sample provided and sent to lab at this time.      Srinath Louis RN  06/02/23 7906

## 2023-06-03 NOTE — ED NOTES
Reviewed discharge information. Patient verbalized understanding of paperwork, medication, and care instructions. Patient denied any questions.       Marilu Juarez RN  06/02/23 1795

## 2023-06-03 NOTE — ED PROVIDER NOTES
HENT:      Head: Normocephalic and atraumatic. Cardiovascular:      Rate and Rhythm: Regular rhythm. Tachycardia present. Pulmonary:      Effort: Pulmonary effort is normal.      Breath sounds: Normal breath sounds. Abdominal:      General: Abdomen is flat. Bowel sounds are increased. Palpations: Abdomen is soft. Tenderness: There is no abdominal tenderness. Skin:     General: Skin is warm. Capillary Refill: Capillary refill takes less than 2 seconds. Neurological:      General: No focal deficit present. Mental Status: She is alert and oriented to person, place, and time. Procedures     MDM  Number of Diagnoses or Management Options  Dehydration  Diarrhea, unspecified type  Diagnosis management comments: Patient was seen and examined. Labs and imaging were ordered. Concerns include ulcerative colitis irritable bowel syndrome Crohn's disease. C. difficile is also a consideration. Lab work was performed which was found to be reassuring patient received IV hydration. Patient was able to provide a stool sample which was sent off for evaluation and I updated patient that she would be updated if something came back positive from this. Patient CT scan was reviewed was found to be reassuring without any evidence of obstruction diverticulitis colitis or other findings. Patient had improvement of symptoms able to tolerate an oral potassium pill in the department. Ultimately patient was felt to be safe for discharge with recommendation to evaluate MyChart or wherever possible call if she is found to be positive for infectious diarrhea. Patient was agreeable with plan she will be discharged to follow-up with her primary care physician.              --------------------------------------------- PAST HISTORY ---------------------------------------------  Past Medical History:  has a past medical history of History of shingles, Hyperlipidemia, and Hypertension.     Past Surgical

## 2023-06-03 NOTE — ED NOTES
This RN notified pt of need for stool sample. Pt reports unable to have bowel movement at this time.      Christie Falcon RN  06/02/23 1274

## 2023-06-04 LAB
BACTERIA STL CULT: NORMAL
C DIFF TOXIN/ANTIGEN: NORMAL
HEMOCCULT SP1 STL QL: NORMAL
ROTAVIRUS ANTIGEN: NORMAL

## 2023-06-05 LAB
BACTERIA STL CULT: NORMAL
FAT STL QL: NORMAL
G LAMBLIA AG STL QL IA: NORMAL
NEUTRAL FAT STL QL: NORMAL

## 2023-06-09 ENCOUNTER — TELEPHONE (OUTPATIENT)
Dept: PRIMARY CARE CLINIC | Age: 68
End: 2023-06-09

## 2023-06-16 DIAGNOSIS — K52.9 ACUTE GASTROENTERITIS: ICD-10-CM

## 2023-06-16 RX ORDER — DICYCLOMINE HYDROCHLORIDE 10 MG/1
CAPSULE ORAL
Qty: 120 CAPSULE | Refills: 4 | Status: SHIPPED | OUTPATIENT
Start: 2023-06-16

## 2023-06-29 PROBLEM — E86.0 DEHYDRATION: Status: RESOLVED | Noted: 2023-05-30 | Resolved: 2023-06-29

## 2023-08-09 ENCOUNTER — HOSPITAL ENCOUNTER (OUTPATIENT)
Dept: ULTRASOUND IMAGING | Age: 68
Discharge: HOME | End: 2023-08-11
Attending: INTERNAL MEDICINE
Payer: MEDICARE

## 2023-08-09 DIAGNOSIS — R10.84 ABDOMINAL PAIN, GENERALIZED: ICD-10-CM

## 2023-08-09 DIAGNOSIS — R11.2 NAUSEA AND VOMITING, UNSPECIFIED VOMITING TYPE: ICD-10-CM

## 2023-08-09 PROCEDURE — 76705 ECHO EXAM OF ABDOMEN: CPT

## 2023-09-06 DIAGNOSIS — E78.2 MIXED HYPERLIPIDEMIA: ICD-10-CM

## 2023-09-06 DIAGNOSIS — N18.31 STAGE 3A CHRONIC KIDNEY DISEASE (HCC): ICD-10-CM

## 2023-09-06 DIAGNOSIS — E11.9 DIET-CONTROLLED DIABETES MELLITUS (HCC): ICD-10-CM

## 2023-09-06 DIAGNOSIS — M81.0 AGE-RELATED OSTEOPOROSIS WITHOUT CURRENT PATHOLOGICAL FRACTURE: ICD-10-CM

## 2023-09-06 LAB
ABSOLUTE IMMATURE GRANULOCYTE: <0.03 K/UL (ref 0–0.58)
ALBUMIN SERPL-MCNC: 4.3 G/DL (ref 3.5–5.2)
ALP BLD-CCNC: 82 U/L (ref 35–104)
ALT SERPL-CCNC: 23 U/L (ref 0–32)
ANION GAP SERPL CALCULATED.3IONS-SCNC: 16 MMOL/L (ref 7–16)
AST SERPL-CCNC: 20 U/L (ref 0–31)
BASOPHILS ABSOLUTE: 0.06 K/UL (ref 0–0.2)
BASOPHILS RELATIVE PERCENT: 1 % (ref 0–2)
BILIRUB SERPL-MCNC: 0.9 MG/DL (ref 0–1.2)
BUN BLDV-MCNC: 15 MG/DL (ref 6–23)
CALCIUM SERPL-MCNC: 9.6 MG/DL (ref 8.6–10.2)
CHLORIDE BLD-SCNC: 104 MMOL/L (ref 98–107)
CHOLESTEROL: 231 MG/DL
CO2: 23 MMOL/L (ref 22–29)
CREAT SERPL-MCNC: 1.2 MG/DL (ref 0.5–1)
EOSINOPHILS ABSOLUTE: 0.2 K/UL (ref 0.05–0.5)
EOSINOPHILS RELATIVE PERCENT: 4 % (ref 0–6)
GFR SERPL CREATININE-BSD FRML MDRD: 49 ML/MIN/1.73M2
GLUCOSE BLD-MCNC: 102 MG/DL (ref 74–99)
HBA1C MFR BLD: 5.1 % (ref 4–5.6)
HCT VFR BLD CALC: 39 % (ref 34–48)
HDLC SERPL-MCNC: 69 MG/DL
HEMOGLOBIN: 12.6 G/DL (ref 11.5–15.5)
IMMATURE GRANULOCYTES: 0 % (ref 0–5)
LDL CHOLESTEROL: 139 MG/DL
LYMPHOCYTES ABSOLUTE: 1.51 K/UL (ref 1.5–4)
LYMPHOCYTES RELATIVE PERCENT: 32 % (ref 20–42)
MCH RBC QN AUTO: 29.5 PG (ref 26–35)
MCHC RBC AUTO-ENTMCNC: 32.3 G/DL (ref 32–34.5)
MCV RBC AUTO: 91.3 FL (ref 80–99.9)
MONOCYTES ABSOLUTE: 0.34 K/UL (ref 0.1–0.95)
MONOCYTES RELATIVE PERCENT: 7 % (ref 2–12)
NEUTROPHILS ABSOLUTE: 2.63 K/UL (ref 1.8–7.3)
NEUTROPHILS RELATIVE PERCENT: 55 % (ref 43–80)
PDW BLD-RTO: 13.6 % (ref 11.5–15)
PLATELET # BLD: 290 K/UL (ref 130–450)
PMV BLD AUTO: 10.4 FL (ref 7–12)
POTASSIUM SERPL-SCNC: 3.9 MMOL/L (ref 3.5–5)
RBC # BLD: 4.27 M/UL (ref 3.5–5.5)
SODIUM BLD-SCNC: 143 MMOL/L (ref 132–146)
TOTAL PROTEIN: 6.7 G/DL (ref 6.4–8.3)
TRIGL SERPL-MCNC: 115 MG/DL
VITAMIN D 25-HYDROXY: 62.8 NG/ML (ref 30–100)
VLDLC SERPL CALC-MCNC: 23 MG/DL
WBC # BLD: 4.8 K/UL (ref 4.5–11.5)

## 2023-09-13 ENCOUNTER — OFFICE VISIT (OUTPATIENT)
Dept: PRIMARY CARE CLINIC | Age: 68
End: 2023-09-13
Payer: MEDICARE

## 2023-09-13 VITALS — TEMPERATURE: 98.1 F | WEIGHT: 144 LBS | BODY MASS INDEX: 27.21 KG/M2

## 2023-09-13 DIAGNOSIS — I10 ESSENTIAL HYPERTENSION: Primary | Chronic | ICD-10-CM

## 2023-09-13 DIAGNOSIS — N18.31 STAGE 3A CHRONIC KIDNEY DISEASE (HCC): ICD-10-CM

## 2023-09-13 DIAGNOSIS — M15.9 PRIMARY OSTEOARTHRITIS INVOLVING MULTIPLE JOINTS: Chronic | ICD-10-CM

## 2023-09-13 DIAGNOSIS — E11.9 DIET-CONTROLLED DIABETES MELLITUS (HCC): ICD-10-CM

## 2023-09-13 DIAGNOSIS — E78.2 MIXED HYPERLIPIDEMIA: Chronic | ICD-10-CM

## 2023-09-13 DIAGNOSIS — E03.9 ACQUIRED HYPOTHYROIDISM: ICD-10-CM

## 2023-09-13 DIAGNOSIS — M81.0 AGE-RELATED OSTEOPOROSIS WITHOUT CURRENT PATHOLOGICAL FRACTURE: ICD-10-CM

## 2023-09-13 DIAGNOSIS — F41.9 ANXIETY: ICD-10-CM

## 2023-09-13 PROCEDURE — 3044F HG A1C LEVEL LT 7.0%: CPT | Performed by: FAMILY MEDICINE

## 2023-09-13 PROCEDURE — 1123F ACP DISCUSS/DSCN MKR DOCD: CPT | Performed by: FAMILY MEDICINE

## 2023-09-13 PROCEDURE — 99214 OFFICE O/P EST MOD 30 MIN: CPT | Performed by: FAMILY MEDICINE

## 2023-09-13 RX ORDER — ESCITALOPRAM OXALATE 10 MG/1
10 TABLET ORAL DAILY
Qty: 90 TABLET | Refills: 5 | Status: SHIPPED | OUTPATIENT
Start: 2023-09-13

## 2023-09-13 ASSESSMENT — ENCOUNTER SYMPTOMS
RESPIRATORY NEGATIVE: 1
EYES NEGATIVE: 1
GASTROINTESTINAL NEGATIVE: 1
ALLERGIC/IMMUNOLOGIC NEGATIVE: 1

## 2023-10-02 ENCOUNTER — TELEPHONE (OUTPATIENT)
Dept: PRIMARY CARE CLINIC | Age: 68
End: 2023-10-02

## 2023-10-02 DIAGNOSIS — N18.31 STAGE 3A CHRONIC KIDNEY DISEASE (HCC): Primary | ICD-10-CM

## 2023-10-02 NOTE — TELEPHONE ENCOUNTER
----- Message from Caden Padilla sent at 10/2/2023 11:00 AM EDT -----  Subject: Referral Request    Reason for referral request? Pt needs yto see a specialist for kidneys. Provider patient wants to be referred to(if known):     Provider Phone Number(if known): Additional Information for Provider? Pt numbers were up and PCP said she   needed to see a specialist for her kidneys.  She needs a name of one.  ---------------------------------------------------------------------------  --------------  Jared HE    [phone]; OK to leave message on voicemail  ---------------------------------------------------------------------------  --------------

## 2023-11-27 ENCOUNTER — TELEPHONE (OUTPATIENT)
Dept: PRIMARY CARE CLINIC | Age: 68
End: 2023-11-27

## 2023-11-27 NOTE — TELEPHONE ENCOUNTER
Pt calling in stating that she is having a scan done Friday 12/1 and they are asking that she does not take her BP medication 48 hours prior. Pt calling to make sure that is okay with you.

## 2023-12-01 ENCOUNTER — HOSPITAL ENCOUNTER (OUTPATIENT)
Dept: NUCLEAR MEDICINE | Age: 68
Discharge: HOME | End: 2023-12-01
Attending: INTERNAL MEDICINE
Payer: MEDICARE

## 2023-12-01 VITALS — DIASTOLIC BLOOD PRESSURE: 94 MMHG | SYSTOLIC BLOOD PRESSURE: 152 MMHG

## 2023-12-01 DIAGNOSIS — E11.9 TYPE 2 DIABETES MELLITUS WITHOUT COMPLICATION, UNSPECIFIED WHETHER LONG TERM INSULIN USE (HCC): ICD-10-CM

## 2023-12-01 DIAGNOSIS — Z87.442 PERSONAL HISTORY OF URINARY CALCULI: ICD-10-CM

## 2023-12-01 DIAGNOSIS — E78.5 HYPERLIPIDEMIA, UNSPECIFIED HYPERLIPIDEMIA TYPE: ICD-10-CM

## 2023-12-01 DIAGNOSIS — K52.9 COLITIS: ICD-10-CM

## 2023-12-01 DIAGNOSIS — E55.9 VITAMIN D DEFICIENCY: ICD-10-CM

## 2023-12-01 DIAGNOSIS — N25.81 SECONDARY HYPERPARATHYROIDISM OF RENAL ORIGIN (HCC): ICD-10-CM

## 2023-12-01 DIAGNOSIS — N18.31 STAGE 3A CHRONIC KIDNEY DISEASE (HCC): ICD-10-CM

## 2023-12-01 PROCEDURE — A9562 TC99M MERTIATIDE: HCPCS | Performed by: RADIOLOGY

## 2023-12-01 PROCEDURE — 6370000000 HC RX 637 (ALT 250 FOR IP): Performed by: RADIOLOGY

## 2023-12-01 PROCEDURE — 3430000000 HC RX DIAGNOSTIC RADIOPHARMACEUTICAL: Performed by: RADIOLOGY

## 2023-12-01 PROCEDURE — 78708 K FLOW/FUNCT IMAGE W/DRUG: CPT

## 2023-12-01 RX ORDER — CAPTOPRIL 25 MG/1
25 TABLET ORAL ONCE
Status: COMPLETED | OUTPATIENT
Start: 2023-12-01 | End: 2023-12-01

## 2023-12-01 RX ADMIN — CAPTOPRIL 25 MG: 25 TABLET ORAL at 14:30

## 2023-12-01 RX ADMIN — Medication 10 MILLICURIE: at 14:21

## 2023-12-09 NOTE — ADDENDUM NOTE
Addended by: Zane How on: 6/19/2019 10:44 AM     Modules accepted: Wilfredo
pt with persistent cough, cxr and labs reassuring.  feels better with neb tx, will dc with albuterol and steroid trial.

## 2023-12-29 LAB
ALBUMIN SERPL-MCNC: 4 G/DL (ref 3.5–5.2)
ALP SERPL-CCNC: 87 U/L (ref 35–104)
ALT SERPL-CCNC: 23 U/L (ref 0–32)
ANION GAP SERPL CALCULATED.3IONS-SCNC: 14 MMOL/L (ref 7–16)
AST SERPL-CCNC: 23 U/L (ref 0–31)
BASOPHILS # BLD: 0.05 K/UL (ref 0–0.2)
BASOPHILS NFR BLD: 1 % (ref 0–2)
BILIRUB SERPL-MCNC: 0.8 MG/DL (ref 0–1.2)
BUN SERPL-MCNC: 19 MG/DL (ref 6–23)
CALCIUM SERPL-MCNC: 9.3 MG/DL (ref 8.6–10.2)
CHLORIDE SERPL-SCNC: 103 MMOL/L (ref 98–107)
CO2 SERPL-SCNC: 24 MMOL/L (ref 22–29)
CREAT SERPL-MCNC: 1.2 MG/DL (ref 0.5–1)
EOSINOPHIL # BLD: 0.17 K/UL (ref 0.05–0.5)
EOSINOPHILS RELATIVE PERCENT: 4 % (ref 0–6)
ERYTHROCYTE [DISTWIDTH] IN BLOOD BY AUTOMATED COUNT: 13.5 % (ref 11.5–15)
FOLATE SERPL-MCNC: 12 NG/ML (ref 4.8–24.2)
GFR SERPL CREATININE-BSD FRML MDRD: 53 ML/MIN/1.73M2
GLUCOSE P FAST SERPL-MCNC: 99 MG/DL (ref 74–99)
HCT VFR BLD AUTO: 40.3 % (ref 34–48)
HGB BLD-MCNC: 13.2 G/DL (ref 11.5–15.5)
IMM GRANULOCYTES # BLD AUTO: <0.03 K/UL (ref 0–0.58)
IMM GRANULOCYTES NFR BLD: 0 % (ref 0–5)
IRON SATN MFR SERPL: 38 % (ref 15–50)
IRON SERPL-MCNC: 108 UG/DL (ref 37–145)
LYMPHOCYTES NFR BLD: 1.56 K/UL (ref 1.5–4)
LYMPHOCYTES RELATIVE PERCENT: 33 % (ref 20–42)
MCH RBC QN AUTO: 29.3 PG (ref 26–35)
MCHC RBC AUTO-ENTMCNC: 32.8 G/DL (ref 32–34.5)
MCV RBC AUTO: 89.6 FL (ref 80–99.9)
MONOCYTES NFR BLD: 0.37 K/UL (ref 0.1–0.95)
MONOCYTES NFR BLD: 8 % (ref 2–12)
NEUTROPHILS NFR BLD: 54 % (ref 43–80)
NEUTS SEG NFR BLD: 2.59 K/UL (ref 1.8–7.3)
PLATELET # BLD AUTO: 304 K/UL (ref 130–450)
PMV BLD AUTO: 10.4 FL (ref 7–12)
POTASSIUM SERPL-SCNC: 3.9 MMOL/L (ref 3.5–5)
PROT SERPL-MCNC: 6.5 G/DL (ref 6.4–8.3)
RBC # BLD AUTO: 4.5 M/UL (ref 3.5–5.5)
SODIUM SERPL-SCNC: 141 MMOL/L (ref 132–146)
TIBC SERPL-MCNC: 281 UG/DL (ref 250–450)
VIT B12 SERPL-MCNC: 382 PG/ML (ref 211–946)
WBC OTHER # BLD: 4.8 K/UL (ref 4.5–11.5)

## 2024-01-20 DIAGNOSIS — F41.9 ANXIETY: ICD-10-CM

## 2024-01-21 RX ORDER — BUPROPION HYDROCHLORIDE 300 MG/1
TABLET ORAL
Qty: 90 TABLET | Refills: 3 | Status: SHIPPED | OUTPATIENT
Start: 2024-01-21

## 2024-02-01 DIAGNOSIS — I10 ESSENTIAL HYPERTENSION: ICD-10-CM

## 2024-02-01 RX ORDER — TRIAMTERENE AND HYDROCHLOROTHIAZIDE 37.5; 25 MG/1; MG/1
1 TABLET ORAL DAILY
Qty: 90 TABLET | Refills: 3 | Status: SHIPPED | OUTPATIENT
Start: 2024-02-01

## 2024-02-01 NOTE — TELEPHONE ENCOUNTER
Patients last appointment 9/13/2023.  Patients next scheduled appointment   Future Appointments   Date Time Provider Department Center   3/13/2024  9:15 AM Sebastián Owens DO [facility]

## 2024-03-05 DIAGNOSIS — E11.9 DIET-CONTROLLED DIABETES MELLITUS (HCC): ICD-10-CM

## 2024-03-05 DIAGNOSIS — E78.2 MIXED HYPERLIPIDEMIA: Chronic | ICD-10-CM

## 2024-03-05 DIAGNOSIS — N18.31 STAGE 3A CHRONIC KIDNEY DISEASE (HCC): ICD-10-CM

## 2024-03-05 DIAGNOSIS — I10 ESSENTIAL HYPERTENSION: Chronic | ICD-10-CM

## 2024-03-05 DIAGNOSIS — M81.0 AGE-RELATED OSTEOPOROSIS WITHOUT CURRENT PATHOLOGICAL FRACTURE: ICD-10-CM

## 2024-03-05 DIAGNOSIS — E03.9 ACQUIRED HYPOTHYROIDISM: ICD-10-CM

## 2024-03-05 LAB
ABSOLUTE IMMATURE GRANULOCYTE: <0.03 K/UL (ref 0–0.58)
ALBUMIN SERPL-MCNC: 4.2 G/DL (ref 3.5–5.2)
ALP BLD-CCNC: 84 U/L (ref 35–104)
ALT SERPL-CCNC: 17 U/L (ref 0–32)
ANION GAP SERPL CALCULATED.3IONS-SCNC: 14 MMOL/L (ref 7–16)
AST SERPL-CCNC: 19 U/L (ref 0–31)
BASOPHILS ABSOLUTE: 0.05 K/UL (ref 0–0.2)
BASOPHILS RELATIVE PERCENT: 1 % (ref 0–2)
BILIRUB SERPL-MCNC: 0.6 MG/DL (ref 0–1.2)
BUN BLDV-MCNC: 15 MG/DL (ref 6–23)
CALCIUM SERPL-MCNC: 9.3 MG/DL (ref 8.6–10.2)
CHLORIDE BLD-SCNC: 105 MMOL/L (ref 98–107)
CHOLESTEROL: 189 MG/DL
CO2: 22 MMOL/L (ref 22–29)
CREAT SERPL-MCNC: 1 MG/DL (ref 0.5–1)
EOSINOPHILS ABSOLUTE: 0.11 K/UL (ref 0.05–0.5)
EOSINOPHILS RELATIVE PERCENT: 3 % (ref 0–6)
GFR SERPL CREATININE-BSD FRML MDRD: >60 ML/MIN/1.73M2
GLUCOSE BLD-MCNC: 97 MG/DL (ref 74–99)
HBA1C MFR BLD: 5.2 % (ref 4–5.6)
HCT VFR BLD CALC: 38.3 % (ref 34–48)
HDLC SERPL-MCNC: 63 MG/DL
HEMOGLOBIN: 12.7 G/DL (ref 11.5–15.5)
IMMATURE GRANULOCYTES: 0 % (ref 0–5)
LDL CHOLESTEROL: 114 MG/DL
LYMPHOCYTES ABSOLUTE: 0.9 K/UL (ref 1.5–4)
LYMPHOCYTES RELATIVE PERCENT: 25 % (ref 20–42)
MCH RBC QN AUTO: 29.7 PG (ref 26–35)
MCHC RBC AUTO-ENTMCNC: 33.2 G/DL (ref 32–34.5)
MCV RBC AUTO: 89.5 FL (ref 80–99.9)
MONOCYTES ABSOLUTE: 0.32 K/UL (ref 0.1–0.95)
MONOCYTES RELATIVE PERCENT: 9 % (ref 2–12)
NEUTROPHILS ABSOLUTE: 2.19 K/UL (ref 1.8–7.3)
NEUTROPHILS RELATIVE PERCENT: 61 % (ref 43–80)
PDW BLD-RTO: 13.4 % (ref 11.5–15)
PLATELET # BLD: 263 K/UL (ref 130–450)
PMV BLD AUTO: 10.7 FL (ref 7–12)
POTASSIUM SERPL-SCNC: 4 MMOL/L (ref 3.5–5)
RBC # BLD: 4.28 M/UL (ref 3.5–5.5)
SODIUM BLD-SCNC: 141 MMOL/L (ref 132–146)
T4 TOTAL: 7.6 UG/DL (ref 4.5–11.7)
TOTAL PROTEIN: 6.5 G/DL (ref 6.4–8.3)
TRIGL SERPL-MCNC: 61 MG/DL
TSH SERPL DL<=0.05 MIU/L-ACNC: 2.55 UIU/ML (ref 0.27–4.2)
VITAMIN D 25-HYDROXY: 61.3 NG/ML (ref 30–100)
VLDLC SERPL CALC-MCNC: 12 MG/DL
WBC # BLD: 3.6 K/UL (ref 4.5–11.5)

## 2024-03-13 ENCOUNTER — OFFICE VISIT (OUTPATIENT)
Dept: PRIMARY CARE CLINIC | Age: 69
End: 2024-03-13
Payer: MEDICARE

## 2024-03-13 VITALS
DIASTOLIC BLOOD PRESSURE: 82 MMHG | SYSTOLIC BLOOD PRESSURE: 134 MMHG | TEMPERATURE: 97.9 F | WEIGHT: 139 LBS | BODY MASS INDEX: 27.29 KG/M2 | HEIGHT: 60 IN

## 2024-03-13 DIAGNOSIS — N18.31 STAGE 3A CHRONIC KIDNEY DISEASE (HCC): ICD-10-CM

## 2024-03-13 DIAGNOSIS — Z00.00 MEDICARE ANNUAL WELLNESS VISIT, SUBSEQUENT: Primary | ICD-10-CM

## 2024-03-13 DIAGNOSIS — N20.0 KIDNEY STONE: ICD-10-CM

## 2024-03-13 DIAGNOSIS — Z12.31 SCREENING MAMMOGRAM FOR BREAST CANCER: ICD-10-CM

## 2024-03-13 DIAGNOSIS — E78.2 MIXED HYPERLIPIDEMIA: ICD-10-CM

## 2024-03-13 DIAGNOSIS — R73.01 IMPAIRED FASTING GLUCOSE: ICD-10-CM

## 2024-03-13 DIAGNOSIS — M15.9 PRIMARY OSTEOARTHRITIS INVOLVING MULTIPLE JOINTS: Chronic | ICD-10-CM

## 2024-03-13 DIAGNOSIS — N25.81 SECONDARY HYPERPARATHYROIDISM OF RENAL ORIGIN (HCC): ICD-10-CM

## 2024-03-13 DIAGNOSIS — M81.0 AGE-RELATED OSTEOPOROSIS WITHOUT CURRENT PATHOLOGICAL FRACTURE: ICD-10-CM

## 2024-03-13 PROBLEM — E11.9 DIET-CONTROLLED DIABETES MELLITUS (HCC): Status: RESOLVED | Noted: 2023-03-13 | Resolved: 2024-03-13

## 2024-03-13 PROCEDURE — 3079F DIAST BP 80-89 MM HG: CPT | Performed by: FAMILY MEDICINE

## 2024-03-13 PROCEDURE — G0439 PPPS, SUBSEQ VISIT: HCPCS | Performed by: FAMILY MEDICINE

## 2024-03-13 PROCEDURE — 1123F ACP DISCUSS/DSCN MKR DOCD: CPT | Performed by: FAMILY MEDICINE

## 2024-03-13 PROCEDURE — 3075F SYST BP GE 130 - 139MM HG: CPT | Performed by: FAMILY MEDICINE

## 2024-03-13 RX ORDER — PRAVASTATIN SODIUM 40 MG
40 TABLET ORAL DAILY
Qty: 90 TABLET | Refills: 5 | Status: SHIPPED | OUTPATIENT
Start: 2024-03-13

## 2024-03-13 ASSESSMENT — PATIENT HEALTH QUESTIONNAIRE - PHQ9
1. LITTLE INTEREST OR PLEASURE IN DOING THINGS: 0
SUM OF ALL RESPONSES TO PHQ9 QUESTIONS 1 & 2: 0
SUM OF ALL RESPONSES TO PHQ QUESTIONS 1-9: 0
2. FEELING DOWN, DEPRESSED OR HOPELESS: 0
SUM OF ALL RESPONSES TO PHQ QUESTIONS 1-9: 0

## 2024-03-13 ASSESSMENT — LIFESTYLE VARIABLES: HOW MANY STANDARD DRINKS CONTAINING ALCOHOL DO YOU HAVE ON A TYPICAL DAY: PATIENT DOES NOT DRINK

## 2024-03-13 NOTE — PROGRESS NOTES
medications incorrectly.  Appropriate follow-up with myself and all specialist.  Encourage family members to take active role in assisting with medications and medical care.  If any confusion should develop to notify my office immediately to avoid risk of worsening medical condition

## 2024-03-13 NOTE — PATIENT INSTRUCTIONS
Learning About Being Active as an Older Adult  Why is being active important as you get older?     Being active is one of the best things you can do for your health. And it's never too late to start. Being active--or getting active, if you aren't already--has definite benefits. It can:  Give you more energy,  Keep your mind sharp.  Improve balance to reduce your risk of falls.  Help you manage chronic illness with fewer medicines.  No matter how old you are, how fit you are, or what health problems you have, there is a form of activity that will work for you. And the more physical activity you can do, the better your overall health will be.  What kinds of activity can help you stay healthy?  Being more active will make your daily activities easier. Physical activity includes planned exercise and things you do in daily life. There are four types of activity:  Aerobic.  Doing aerobic activity makes your heart and lungs strong.  Includes walking, dancing, and gardening.  Aim for at least 2½ hours spread throughout the week.  It improves your energy and can help you sleep better.  Muscle-strengthening.  This type of activity can help maintain muscle and strengthen bones.  Includes climbing stairs, using resistance bands, and lifting or carrying heavy loads.  Aim for at least twice a week.  It can help protect the knees and other joints.  Stretching.  Stretching gives you better range of motion in joints and muscles.  Includes upper arm stretches, calf stretches, and gentle yoga.  Aim for at least twice a week, preferably after your muscles are warmed up from other activities.  It can help you function better in daily life.  Balancing.  This helps you stay coordinated and have good posture.  Includes heel-to-toe walking, cody chi, and certain types of yoga.  Aim for at least 3 days a week.  It can reduce your risk of falling.  Even if you have a hard time meeting the recommendations, it's better to be more active

## 2024-05-15 ENCOUNTER — OFFICE VISIT (OUTPATIENT)
Dept: FAMILY MEDICINE CLINIC | Age: 69
End: 2024-05-15

## 2024-05-15 VITALS
BODY MASS INDEX: 26.11 KG/M2 | TEMPERATURE: 98.3 F | HEART RATE: 90 BPM | OXYGEN SATURATION: 96 % | WEIGHT: 133 LBS | DIASTOLIC BLOOD PRESSURE: 72 MMHG | SYSTOLIC BLOOD PRESSURE: 110 MMHG | HEIGHT: 60 IN

## 2024-05-15 DIAGNOSIS — B34.9 VIRAL ILLNESS: Primary | ICD-10-CM

## 2024-05-15 DIAGNOSIS — R68.89 FLU-LIKE SYMPTOMS: ICD-10-CM

## 2024-05-15 LAB
INFLUENZA A ANTIBODY: NEGATIVE
INFLUENZA B ANTIBODY: NEGATIVE
Lab: NORMAL
PERFORMING INSTRUMENT: NORMAL
QC PASS/FAIL: NORMAL
SARS-COV-2, POC: NORMAL

## 2024-05-15 NOTE — PROGRESS NOTES
Chief Complaint       Generalized Body Aches, Headache, and Fatigue    History of Present Illness   Source of history provided by:  patient.      Minerva Mahajan is a 68 y.o. old female presenting to the walk in clinic for evaluation of headache, body aches, subjective fever, chills, fatigue, nausea, dry heaving x 5 days.  Denies any other URI symptoms.  Has been taking nothing OTC without relief. Denies any fever, chills, wheezing, CP, SOB, or GI symptoms.  Denies any ill exposure.    ROS    Unless otherwise stated in this report or unable to obtain because of the patient's clinical or mental status as evidenced by the medical record, this patients's positive and negative responses for Review of Systems, constitutional, psych, eyes, ENT, cardiovascular, respiratory, gastrointestinal, neurological, genitourinary, musculoskeletal, integument systems and systems related to the presenting problem are either stated in the preceding or were not pertinent or were negative for the symptoms and/or complaints related to the medical problem.    Physical Exam         VS:  /72   Pulse 90   Temp 98.3 °F (36.8 °C)   Ht 1.524 m (5')   Wt 60.3 kg (133 lb)   SpO2 96%   BMI 25.97 kg/m²    Oxygen Saturation Interpretation: Normal.    Constitutional:  Alert, development consistent with age.  Ears:  External Ears: Bilateral pinna normal. TMs translucent without erythema or perforation bilaterally.  Canals normal bilaterally without swelling or exudate  Nose: Mild congestion of the nasal mucosa. There is injection to middle turbinates bilaterally.   Throat: Minimal posterior pharyngeal erythema with mild post nasal drip present.  No exudate or tonsillar hypertrophy noted.    Neck:  Supple. There is no anterior cervical adenopathy.  Lungs: CTAB without wheezes, rales, or rhonchi  Heart:  Regular rate and rhythm, normal heart sounds, without pathological murmurs, ectopy, gallops, or rubs.  Skin:  Normal turgor.  Warm, dry,

## 2024-05-21 ENCOUNTER — APPOINTMENT (OUTPATIENT)
Dept: CT IMAGING | Age: 69
End: 2024-05-21
Payer: MEDICARE

## 2024-05-21 ENCOUNTER — HOSPITAL ENCOUNTER (EMERGENCY)
Age: 69
Discharge: HOME | End: 2024-05-21
Attending: STUDENT IN AN ORGANIZED HEALTH CARE EDUCATION/TRAINING PROGRAM
Payer: MEDICARE

## 2024-05-21 ENCOUNTER — APPOINTMENT (OUTPATIENT)
Dept: ULTRASOUND IMAGING | Age: 69
End: 2024-05-21
Payer: MEDICARE

## 2024-05-21 ENCOUNTER — APPOINTMENT (OUTPATIENT)
Dept: GENERAL RADIOLOGY | Age: 69
End: 2024-05-21
Payer: MEDICARE

## 2024-05-21 VITALS
TEMPERATURE: 98 F | BODY MASS INDEX: 24.35 KG/M2 | RESPIRATION RATE: 18 BRPM | HEIGHT: 60 IN | DIASTOLIC BLOOD PRESSURE: 76 MMHG | SYSTOLIC BLOOD PRESSURE: 135 MMHG | WEIGHT: 124 LBS | HEART RATE: 64 BPM | OXYGEN SATURATION: 100 %

## 2024-05-21 DIAGNOSIS — A49.9 UTI (URINARY TRACT INFECTION), BACTERIAL: Primary | ICD-10-CM

## 2024-05-21 DIAGNOSIS — R79.89 ELEVATED SERUM FREE T4 LEVEL: ICD-10-CM

## 2024-05-21 DIAGNOSIS — R53.83 FATIGUE, UNSPECIFIED TYPE: ICD-10-CM

## 2024-05-21 DIAGNOSIS — N39.0 UTI (URINARY TRACT INFECTION), BACTERIAL: Primary | ICD-10-CM

## 2024-05-21 DIAGNOSIS — E86.0 MILD DEHYDRATION: ICD-10-CM

## 2024-05-21 DIAGNOSIS — R74.01 TRANSAMINITIS: ICD-10-CM

## 2024-05-21 LAB
ALBUMIN SERPL-MCNC: 3.7 G/DL (ref 3.5–5.2)
ALP SERPL-CCNC: 155 U/L (ref 35–104)
ALT SERPL-CCNC: 112 U/L (ref 0–32)
ANION GAP SERPL CALCULATED.3IONS-SCNC: 12 MMOL/L (ref 7–16)
AST SERPL-CCNC: 49 U/L (ref 0–31)
B PARAP IS1001 DNA NPH QL NAA+NON-PROBE: NOT DETECTED
B PERT DNA SPEC QL NAA+PROBE: NOT DETECTED
BACTERIA URNS QL MICRO: ABNORMAL
BASOPHILS # BLD: 0.03 K/UL (ref 0–0.2)
BASOPHILS NFR BLD: 0 % (ref 0–2)
BILIRUB SERPL-MCNC: 0.9 MG/DL (ref 0–1.2)
BILIRUB UR QL STRIP: NEGATIVE
BNP SERPL-MCNC: 64 PG/ML (ref 0–125)
BUN SERPL-MCNC: 17 MG/DL (ref 6–23)
C PNEUM DNA NPH QL NAA+NON-PROBE: NOT DETECTED
CALCIUM SERPL-MCNC: 9.3 MG/DL (ref 8.6–10.2)
CHLORIDE SERPL-SCNC: 90 MMOL/L (ref 98–107)
CLARITY UR: CLEAR
CO2 SERPL-SCNC: 30 MMOL/L (ref 22–29)
COLOR UR: YELLOW
CREAT SERPL-MCNC: 0.9 MG/DL (ref 0.5–1)
EKG ATRIAL RATE: 111 BPM
EKG P AXIS: 57 DEGREES
EKG P-R INTERVAL: 160 MS
EKG Q-T INTERVAL: 356 MS
EKG QRS DURATION: 90 MS
EKG QTC CALCULATION (BAZETT): 484 MS
EKG R AXIS: 42 DEGREES
EKG T AXIS: 77 DEGREES
EKG VENTRICULAR RATE: 111 BPM
EOSINOPHIL # BLD: 0.02 K/UL (ref 0.05–0.5)
EOSINOPHILS RELATIVE PERCENT: 0 % (ref 0–6)
ERYTHROCYTE [DISTWIDTH] IN BLOOD BY AUTOMATED COUNT: 13.1 % (ref 11.5–15)
FLUAV RNA NPH QL NAA+NON-PROBE: NOT DETECTED
FLUBV RNA NPH QL NAA+NON-PROBE: NOT DETECTED
GFR, ESTIMATED: 69 ML/MIN/1.73M2
GLUCOSE SERPL-MCNC: 109 MG/DL (ref 74–99)
GLUCOSE UR STRIP-MCNC: NEGATIVE MG/DL
HADV DNA NPH QL NAA+NON-PROBE: NOT DETECTED
HCOV 229E RNA NPH QL NAA+NON-PROBE: NOT DETECTED
HCOV HKU1 RNA NPH QL NAA+NON-PROBE: NOT DETECTED
HCOV NL63 RNA NPH QL NAA+NON-PROBE: NOT DETECTED
HCOV OC43 RNA NPH QL NAA+NON-PROBE: NOT DETECTED
HCT VFR BLD AUTO: 40.2 % (ref 34–48)
HETEROPH AB BLD QL IA: NEGATIVE
HGB BLD-MCNC: 13.4 G/DL (ref 11.5–15.5)
HGB UR QL STRIP.AUTO: NEGATIVE
HMPV RNA NPH QL NAA+NON-PROBE: NOT DETECTED
HPIV1 RNA NPH QL NAA+NON-PROBE: NOT DETECTED
HPIV2 RNA NPH QL NAA+NON-PROBE: NOT DETECTED
HPIV3 RNA NPH QL NAA+NON-PROBE: NOT DETECTED
HPIV4 RNA NPH QL NAA+NON-PROBE: NOT DETECTED
IMM GRANULOCYTES # BLD AUTO: 0.21 K/UL (ref 0–0.58)
IMM GRANULOCYTES NFR BLD: 3 % (ref 0–5)
KETONES UR STRIP-MCNC: NEGATIVE MG/DL
LACTATE BLDV-SCNC: 1.5 MMOL/L (ref 0.5–2.2)
LEUKOCYTE ESTERASE UR QL STRIP: ABNORMAL
LYMPHOCYTES NFR BLD: 0.91 K/UL (ref 1.5–4)
LYMPHOCYTES RELATIVE PERCENT: 13 % (ref 20–42)
M PNEUMO DNA NPH QL NAA+NON-PROBE: NOT DETECTED
MAGNESIUM SERPL-MCNC: 2.4 MG/DL (ref 1.6–2.6)
MCH RBC QN AUTO: 28.7 PG (ref 26–35)
MCHC RBC AUTO-ENTMCNC: 33.3 G/DL (ref 32–34.5)
MCV RBC AUTO: 86.1 FL (ref 80–99.9)
MONOCYTES NFR BLD: 0.53 K/UL (ref 0.1–0.95)
MONOCYTES NFR BLD: 7 % (ref 2–12)
NEUTROPHILS NFR BLD: 76 % (ref 43–80)
NEUTS SEG NFR BLD: 5.49 K/UL (ref 1.8–7.3)
NITRITE UR QL STRIP: NEGATIVE
PH UR STRIP: 7.5 [PH] (ref 5–9)
PLATELET # BLD AUTO: 463 K/UL (ref 130–450)
PMV BLD AUTO: 9.1 FL (ref 7–12)
POTASSIUM SERPL-SCNC: 3 MMOL/L (ref 3.5–5)
PROT SERPL-MCNC: 6.9 G/DL (ref 6.4–8.3)
PROT UR STRIP-MCNC: NEGATIVE MG/DL
RBC # BLD AUTO: 4.67 M/UL (ref 3.5–5.5)
RBC #/AREA URNS HPF: ABNORMAL /HPF
RSV RNA NPH QL NAA+NON-PROBE: NOT DETECTED
RV+EV RNA NPH QL NAA+NON-PROBE: NOT DETECTED
SARS-COV-2 RNA NPH QL NAA+NON-PROBE: NOT DETECTED
SODIUM SERPL-SCNC: 132 MMOL/L (ref 132–146)
SP GR UR STRIP: 1.01 (ref 1–1.03)
SPECIMEN DESCRIPTION: NORMAL
SPECIMEN SOURCE: NORMAL
STREP A, MOLECULAR: NEGATIVE
T4 FREE SERPL-MCNC: 2.2 NG/DL (ref 0.9–1.7)
TROPONIN I SERPL HS-MCNC: 9 NG/L (ref 0–9)
TSH SERPL DL<=0.05 MIU/L-ACNC: 2 UIU/ML (ref 0.27–4.2)
UROBILINOGEN UR STRIP-ACNC: 1 EU/DL (ref 0–1)
WBC #/AREA URNS HPF: ABNORMAL /HPF
WBC OTHER # BLD: 7.2 K/UL (ref 4.5–11.5)

## 2024-05-21 PROCEDURE — 0202U NFCT DS 22 TRGT SARS-COV-2: CPT

## 2024-05-21 PROCEDURE — 96374 THER/PROPH/DIAG INJ IV PUSH: CPT

## 2024-05-21 PROCEDURE — 80053 COMPREHEN METABOLIC PANEL: CPT

## 2024-05-21 PROCEDURE — 84439 ASSAY OF FREE THYROXINE: CPT

## 2024-05-21 PROCEDURE — 93010 ELECTROCARDIOGRAM REPORT: CPT | Performed by: INTERNAL MEDICINE

## 2024-05-21 PROCEDURE — 71046 X-RAY EXAM CHEST 2 VIEWS: CPT

## 2024-05-21 PROCEDURE — 2580000003 HC RX 258: Performed by: STUDENT IN AN ORGANIZED HEALTH CARE EDUCATION/TRAINING PROGRAM

## 2024-05-21 PROCEDURE — 86308 HETEROPHILE ANTIBODY SCREEN: CPT

## 2024-05-21 PROCEDURE — 83880 ASSAY OF NATRIURETIC PEPTIDE: CPT

## 2024-05-21 PROCEDURE — 70450 CT HEAD/BRAIN W/O DYE: CPT

## 2024-05-21 PROCEDURE — 85025 COMPLETE CBC W/AUTO DIFF WBC: CPT

## 2024-05-21 PROCEDURE — 84443 ASSAY THYROID STIM HORMONE: CPT

## 2024-05-21 PROCEDURE — 87086 URINE CULTURE/COLONY COUNT: CPT

## 2024-05-21 PROCEDURE — 96361 HYDRATE IV INFUSION ADD-ON: CPT

## 2024-05-21 PROCEDURE — 6360000002 HC RX W HCPCS: Performed by: STUDENT IN AN ORGANIZED HEALTH CARE EDUCATION/TRAINING PROGRAM

## 2024-05-21 PROCEDURE — 93005 ELECTROCARDIOGRAM TRACING: CPT

## 2024-05-21 PROCEDURE — 87651 STREP A DNA AMP PROBE: CPT

## 2024-05-21 PROCEDURE — 76705 ECHO EXAM OF ABDOMEN: CPT

## 2024-05-21 PROCEDURE — 81001 URINALYSIS AUTO W/SCOPE: CPT

## 2024-05-21 PROCEDURE — 83735 ASSAY OF MAGNESIUM: CPT

## 2024-05-21 PROCEDURE — 74177 CT ABD & PELVIS W/CONTRAST: CPT

## 2024-05-21 PROCEDURE — 6360000004 HC RX CONTRAST MEDICATION: Performed by: RADIOLOGY

## 2024-05-21 PROCEDURE — 83605 ASSAY OF LACTIC ACID: CPT

## 2024-05-21 PROCEDURE — 6370000000 HC RX 637 (ALT 250 FOR IP)

## 2024-05-21 PROCEDURE — 84484 ASSAY OF TROPONIN QUANT: CPT

## 2024-05-21 PROCEDURE — 2580000003 HC RX 258

## 2024-05-21 PROCEDURE — 99285 EMERGENCY DEPT VISIT HI MDM: CPT

## 2024-05-21 RX ORDER — 0.9 % SODIUM CHLORIDE 0.9 %
1000 INTRAVENOUS SOLUTION INTRAVENOUS ONCE
Status: COMPLETED | OUTPATIENT
Start: 2024-05-21 | End: 2024-05-21

## 2024-05-21 RX ORDER — POTASSIUM CHLORIDE 20 MEQ/1
40 TABLET, EXTENDED RELEASE ORAL ONCE
Status: COMPLETED | OUTPATIENT
Start: 2024-05-21 | End: 2024-05-21

## 2024-05-21 RX ORDER — CEFDINIR 300 MG/1
300 CAPSULE ORAL 2 TIMES DAILY
Qty: 20 CAPSULE | Refills: 0 | Status: SHIPPED | OUTPATIENT
Start: 2024-05-21 | End: 2024-05-31

## 2024-05-21 RX ADMIN — POTASSIUM CHLORIDE 40 MEQ: 1500 TABLET, EXTENDED RELEASE ORAL at 13:52

## 2024-05-21 RX ADMIN — IOPAMIDOL 75 ML: 755 INJECTION, SOLUTION INTRAVENOUS at 14:23

## 2024-05-21 RX ADMIN — SODIUM CHLORIDE 1000 ML: 9 INJECTION, SOLUTION INTRAVENOUS at 12:24

## 2024-05-21 RX ADMIN — SODIUM CHLORIDE 1000 ML: 9 INJECTION, SOLUTION INTRAVENOUS at 16:03

## 2024-05-21 RX ADMIN — CEFTRIAXONE SODIUM 1000 MG: 1 INJECTION, POWDER, FOR SOLUTION INTRAMUSCULAR; INTRAVENOUS at 16:14

## 2024-05-21 ASSESSMENT — PAIN - FUNCTIONAL ASSESSMENT: PAIN_FUNCTIONAL_ASSESSMENT: NONE - DENIES PAIN

## 2024-05-21 NOTE — DISCHARGE INSTRUCTIONS
Please return to the ER for any new or worsening symptoms including but not limited to Fever, Chest pain or difficulty breathing, or difficulty urinating/decreased urination  If prescribed, please be sure to  your prescriptions from the pharmacy  Please follow-up with Primary care provider and endocrinology  as instructed

## 2024-05-23 ENCOUNTER — OFFICE VISIT (OUTPATIENT)
Dept: PRIMARY CARE CLINIC | Age: 69
End: 2024-05-23

## 2024-05-23 VITALS
OXYGEN SATURATION: 98 % | RESPIRATION RATE: 17 BRPM | HEART RATE: 91 BPM | TEMPERATURE: 97.6 F | WEIGHT: 130.6 LBS | SYSTOLIC BLOOD PRESSURE: 128 MMHG | BODY MASS INDEX: 25.51 KG/M2 | DIASTOLIC BLOOD PRESSURE: 78 MMHG

## 2024-05-23 DIAGNOSIS — R79.89 ELEVATED LIVER FUNCTION TESTS: ICD-10-CM

## 2024-05-23 DIAGNOSIS — R53.82 CHRONIC FATIGUE: ICD-10-CM

## 2024-05-23 DIAGNOSIS — E03.9 ACQUIRED HYPOTHYROIDISM: ICD-10-CM

## 2024-05-23 DIAGNOSIS — E86.0 DEHYDRATION: ICD-10-CM

## 2024-05-23 DIAGNOSIS — N30.00 ACUTE CYSTITIS WITHOUT HEMATURIA: Primary | ICD-10-CM

## 2024-05-23 DIAGNOSIS — I10 ESSENTIAL HYPERTENSION: ICD-10-CM

## 2024-05-23 LAB
MICROORGANISM SPEC CULT: ABNORMAL
SPECIMEN DESCRIPTION: ABNORMAL

## 2024-05-23 SDOH — ECONOMIC STABILITY: HOUSING INSECURITY
IN THE LAST 12 MONTHS, WAS THERE A TIME WHEN YOU DID NOT HAVE A STEADY PLACE TO SLEEP OR SLEPT IN A SHELTER (INCLUDING NOW)?: NO

## 2024-05-23 SDOH — ECONOMIC STABILITY: FOOD INSECURITY: WITHIN THE PAST 12 MONTHS, THE FOOD YOU BOUGHT JUST DIDN'T LAST AND YOU DIDN'T HAVE MONEY TO GET MORE.: NEVER TRUE

## 2024-05-23 SDOH — ECONOMIC STABILITY: INCOME INSECURITY: HOW HARD IS IT FOR YOU TO PAY FOR THE VERY BASICS LIKE FOOD, HOUSING, MEDICAL CARE, AND HEATING?: NOT HARD AT ALL

## 2024-05-23 SDOH — ECONOMIC STABILITY: FOOD INSECURITY: WITHIN THE PAST 12 MONTHS, YOU WORRIED THAT YOUR FOOD WOULD RUN OUT BEFORE YOU GOT MONEY TO BUY MORE.: NEVER TRUE

## 2024-05-23 ASSESSMENT — PATIENT HEALTH QUESTIONNAIRE - PHQ9
SUM OF ALL RESPONSES TO PHQ9 QUESTIONS 1 & 2: 0
SUM OF ALL RESPONSES TO PHQ QUESTIONS 1-9: 0
SUM OF ALL RESPONSES TO PHQ QUESTIONS 1-9: 0
1. LITTLE INTEREST OR PLEASURE IN DOING THINGS: NOT AT ALL
2. FEELING DOWN, DEPRESSED OR HOPELESS: NOT AT ALL
SUM OF ALL RESPONSES TO PHQ QUESTIONS 1-9: 0
SUM OF ALL RESPONSES TO PHQ QUESTIONS 1-9: 0

## 2024-05-23 NOTE — PROGRESS NOTES
24  Name: Minerva Mahajan    : 1955    Sex: female    Age: 68 y.o.        Subjective:  Chief Complaint: Patient is here for  er follow up     [facility]   then   yest with   er  with  tired   faitge  and sleept     Workup thre  with  left base  poss  atel   ct    lab with  K lwo  and LFt up  Dc with on omnicef  Feels much beter now  bu tno energy  Denies  diar   dysruia  Admtis nto eatitn much        Review of Systems   Constitutional:  Positive for fatigue.   HENT: Negative.     Eyes: Negative.    Respiratory: Negative.     Cardiovascular: Negative.    Gastrointestinal: Negative.    Endocrine: Negative.    Genitourinary: Negative.    Musculoskeletal: Negative.    Skin: Negative.    Allergic/Immunologic: Negative.    Neurological: Negative.    Hematological: Negative.    Psychiatric/Behavioral: Negative.           Current Outpatient Medications:     cefdinir (OMNICEF) 300 MG capsule, Take 1 capsule by mouth 2 times daily for 10 days, Disp: 20 capsule, Rfl: 0    pravastatin (PRAVACHOL) 40 MG tablet, Take 1 tablet by mouth daily No food, Disp: 90 tablet, Rfl: 5    triamterene-hydroCHLOROthiazide (MAXZIDE-25) 37.5-25 MG per tablet, Take 1 tablet by mouth daily, Disp: 90 tablet, Rfl: 3    buPROPion (WELLBUTRIN XL) 300 MG extended release tablet, TAKE 1 TABLET BY MOUTH EVERY DAY IN THE MORNING, Disp: 90 tablet, Rfl: 3    escitalopram (LEXAPRO) 10 MG tablet, Take 1 tablet by mouth daily For anxiety, Disp: 90 tablet, Rfl: 5    dicyclomine (BENTYL) 10 MG capsule, take 1 capsule by mouth four times a day, Disp: 120 capsule, Rfl: 4    ondansetron (ZOFRAN) 4 MG tablet, Take 1 tablet by mouth 3 times daily as needed for Nausea or Vomiting, Disp: 60 tablet, Rfl: 2    vitamin D (ERGOCALCIFEROL) 1.25 MG (17744 UT) CAPS capsule, One weekly for 12 weeks then vit d 3  2000 daily, Disp: 12 capsule, Rfl: 0    RESTASIS 0.05 % ophthalmic emulsion, INSTILL 1 DROP INTO BOTH EYES EVERY 12 HOURS, Disp: , Rfl: 11    acyclovir (ZOVIRAX) 5

## 2024-05-24 ASSESSMENT — ENCOUNTER SYMPTOMS
DIARRHEA: 0
CONSTIPATION: 0
VOMITING: 0
ABDOMINAL PAIN: 0
COUGH: 0
NAUSEA: 0
BACK PAIN: 0
SHORTNESS OF BREATH: 0

## 2024-05-24 NOTE — ED PROVIDER NOTES
cefdinir (OMNICEF) 300 MG capsule Take 1 capsule by mouth 2 times daily for 10 days, Disp-20 capsule, R-0Normal             DISCONTINUED MEDICATIONS:  Discharge Medication List as of 5/21/2024  4:40 PM                      Rimma Alcaraz D.O.     Emergency Medicine      5/24/2024 2:24 AM      NOTE: This report was transcribed using voice recognition software. Every effort was made to ensure accuracy; however, inadvertent computerized transcription errors may be present            Rimma Alcaraz DO  05/24/24 0224

## 2024-05-28 DIAGNOSIS — I10 ESSENTIAL HYPERTENSION: ICD-10-CM

## 2024-05-28 DIAGNOSIS — E86.0 DEHYDRATION: ICD-10-CM

## 2024-05-28 DIAGNOSIS — R79.89 ELEVATED LIVER FUNCTION TESTS: ICD-10-CM

## 2024-05-28 DIAGNOSIS — E03.9 ACQUIRED HYPOTHYROIDISM: ICD-10-CM

## 2024-05-28 LAB
ALBUMIN: 4 G/DL (ref 3.5–5.2)
ALP BLD-CCNC: 125 U/L (ref 35–104)
ALT SERPL-CCNC: 40 U/L (ref 0–32)
ANION GAP SERPL CALCULATED.3IONS-SCNC: 18 MMOL/L (ref 7–16)
AST SERPL-CCNC: 28 U/L (ref 0–31)
BASOPHILS ABSOLUTE: 0.05 K/UL (ref 0–0.2)
BASOPHILS RELATIVE PERCENT: 1 % (ref 0–2)
BILIRUB SERPL-MCNC: 0.8 MG/DL (ref 0–1.2)
BUN BLDV-MCNC: 10 MG/DL (ref 6–23)
CALCIUM SERPL-MCNC: 9.4 MG/DL (ref 8.6–10.2)
CHLORIDE BLD-SCNC: 100 MMOL/L (ref 98–107)
CO2: 21 MMOL/L (ref 22–29)
CREAT SERPL-MCNC: 1 MG/DL (ref 0.5–1)
EOSINOPHILS ABSOLUTE: 0 K/UL (ref 0.05–0.5)
EOSINOPHILS RELATIVE PERCENT: 0 % (ref 0–6)
GFR, ESTIMATED: 64 ML/MIN/1.73M2
GLUCOSE BLD-MCNC: 90 MG/DL (ref 74–99)
HCT VFR BLD CALC: 40 % (ref 34–48)
HEMOGLOBIN: 12.8 G/DL (ref 11.5–15.5)
IMMATURE GRANULOCYTES %: 0 % (ref 0–5)
IMMATURE GRANULOCYTES ABSOLUTE: <0.03 K/UL (ref 0–0.58)
LYMPHOCYTES ABSOLUTE: 1.31 K/UL (ref 1.5–4)
LYMPHOCYTES RELATIVE PERCENT: 28 % (ref 20–42)
MAGNESIUM: 2.2 MG/DL (ref 1.6–2.6)
MCH RBC QN AUTO: 29.3 PG (ref 26–35)
MCHC RBC AUTO-ENTMCNC: 32 G/DL (ref 32–34.5)
MCV RBC AUTO: 91.5 FL (ref 80–99.9)
MONOCYTES ABSOLUTE: 0.38 K/UL (ref 0.1–0.95)
MONOCYTES RELATIVE PERCENT: 8 % (ref 2–12)
NEUTROPHILS ABSOLUTE: 2.92 K/UL (ref 1.8–7.3)
NEUTROPHILS RELATIVE PERCENT: 62 % (ref 43–80)
PDW BLD-RTO: 14.2 % (ref 11.5–15)
PLATELET # BLD: 472 K/UL (ref 130–450)
PMV BLD AUTO: 9.2 FL (ref 7–12)
POTASSIUM SERPL-SCNC: 4.5 MMOL/L (ref 3.5–5)
RBC # BLD: 4.37 M/UL (ref 3.5–5.5)
SODIUM BLD-SCNC: 139 MMOL/L (ref 132–146)
T4 TOTAL: 8.9 UG/DL (ref 4.5–11.7)
TOTAL PROTEIN: 6.6 G/DL (ref 6.4–8.3)
TSH SERPL DL<=0.05 MIU/L-ACNC: 5.33 UIU/ML (ref 0.27–4.2)
WBC # BLD: 4.7 K/UL (ref 4.5–11.5)

## 2024-05-29 ENCOUNTER — OFFICE VISIT (OUTPATIENT)
Dept: PRIMARY CARE CLINIC | Age: 69
End: 2024-05-29

## 2024-05-29 VITALS
BODY MASS INDEX: 25.51 KG/M2 | RESPIRATION RATE: 17 BRPM | OXYGEN SATURATION: 95 % | WEIGHT: 130.6 LBS | DIASTOLIC BLOOD PRESSURE: 78 MMHG | TEMPERATURE: 97.6 F | HEART RATE: 100 BPM | SYSTOLIC BLOOD PRESSURE: 128 MMHG

## 2024-05-29 DIAGNOSIS — L30.9 DERMATITIS: ICD-10-CM

## 2024-05-29 DIAGNOSIS — E03.9 ACQUIRED HYPOTHYROIDISM: Primary | ICD-10-CM

## 2024-05-29 DIAGNOSIS — R79.89 ELEVATED LIVER FUNCTION TESTS: ICD-10-CM

## 2024-05-29 DIAGNOSIS — E87.6 HYPOKALEMIA: ICD-10-CM

## 2024-05-29 RX ORDER — ACYCLOVIR 50 MG/G
OINTMENT TOPICAL
Qty: 1 EACH | Refills: 5 | Status: SHIPPED | OUTPATIENT
Start: 2024-05-29 | End: 2024-06-05

## 2024-05-29 ASSESSMENT — ENCOUNTER SYMPTOMS
GASTROINTESTINAL NEGATIVE: 1
EYES NEGATIVE: 1
RESPIRATORY NEGATIVE: 1
ALLERGIC/IMMUNOLOGIC NEGATIVE: 1

## 2024-05-29 NOTE — PROGRESS NOTES
CREA, , Disp: , Rfl:   No Known Allergies  Social History     Socioeconomic History    Marital status:      Spouse name: Not on file    Number of children: Not on file    Years of education: Not on file    Highest education level: Not on file   Occupational History    Not on file   Tobacco Use    Smoking status: Former     Current packs/day: 0.00     Average packs/day: 0.5 packs/day for 10.0 years (5.0 ttl pk-yrs)     Types: Cigarettes     Start date:      Quit date:      Years since quittin.4    Smokeless tobacco: Never    Tobacco comments:     QUIT AGE 44   Vaping Use    Vaping Use: Never used   Substance and Sexual Activity    Alcohol use: Not on file    Drug use: Not on file    Sexual activity: Not on file   Other Topics Concern    Not on file   Social History Narrative        Bone Density Scan - (2009)    DEPRESSION    HYPERLIPIDEMIA    DIV---ENGAGED---    TWO KIDS----GIRLS------two grands--rip not want kids-----BF----in ton------- other   two kids--single    AT [employer]---------------------retired   9-21     then 10 hrs a week    FOCUS ISSUE    ELEV BP    QUIT SMOKING AGE 44    SIS AGE 60 WITH INTRACRANIAL BLEED----FINE    THREE OTHER SISTERS FINE ALSO    ABNORMAL BASELINE EKG    TMT 2-13    MOM  QUICKLY AT 89 9-13 WITH BAD HEART VALVE    DAD  LUNG CA----AGE 76    Minerva Bees  1955 Page #2    HTN 9    INSOMNIA    DEPRESSION ---AFTER MOM     FOOT PAIN DR HERNANDEZ 10 -    LEFT TOTAL KNEE SURGERY DR GUTIERREZ 15    REFUSES COLONOSCOPY 8-15    LEFT ABDOMEN SHINGLES 12-16 AGAIN 5-18    DAUGHTER UBALDO---NOT TALK---TWO KIDS    ARINA---ALL OK---DATES LULÚ WHO OWNS MR [address] 12 YRS OLDER RETAIL    Add  With dr tyson and rx with    straterra  And  Dc   Due to elev bp    Sister diagnosed with lymphoma  at age 75 years old and survived    Brother-in-law  at 59 years old from motor cycle accident     Kiko had a stroke 2021 and

## 2024-08-24 DIAGNOSIS — Z12.31 SCREENING MAMMOGRAM FOR BREAST CANCER: ICD-10-CM

## 2024-09-05 DIAGNOSIS — N18.31 STAGE 3A CHRONIC KIDNEY DISEASE (HCC): ICD-10-CM

## 2024-09-05 DIAGNOSIS — R73.01 IMPAIRED FASTING GLUCOSE: ICD-10-CM

## 2024-09-05 DIAGNOSIS — E78.2 MIXED HYPERLIPIDEMIA: ICD-10-CM

## 2024-09-05 DIAGNOSIS — N25.81 SECONDARY HYPERPARATHYROIDISM OF RENAL ORIGIN (HCC): ICD-10-CM

## 2024-09-05 DIAGNOSIS — M81.0 AGE-RELATED OSTEOPOROSIS WITHOUT CURRENT PATHOLOGICAL FRACTURE: ICD-10-CM

## 2024-09-05 DIAGNOSIS — E03.9 ACQUIRED HYPOTHYROIDISM: ICD-10-CM

## 2024-09-05 LAB
ALBUMIN: 4.2 G/DL (ref 3.5–5.2)
ALP BLD-CCNC: 83 U/L (ref 35–104)
ALT SERPL-CCNC: 18 U/L (ref 0–32)
ANION GAP SERPL CALCULATED.3IONS-SCNC: 14 MMOL/L (ref 7–16)
AST SERPL-CCNC: 23 U/L (ref 0–31)
BASOPHILS ABSOLUTE: 0.03 K/UL (ref 0–0.2)
BASOPHILS RELATIVE PERCENT: 1 % (ref 0–2)
BILIRUB SERPL-MCNC: 0.7 MG/DL (ref 0–1.2)
BILIRUBIN, URINE: NEGATIVE
BUN BLDV-MCNC: 16 MG/DL (ref 6–23)
CALCIUM SERPL-MCNC: 9.2 MG/DL (ref 8.6–10.2)
CHLORIDE BLD-SCNC: 105 MMOL/L (ref 98–107)
CHOLESTEROL, TOTAL: 208 MG/DL
CO2: 23 MMOL/L (ref 22–29)
COLOR, UA: YELLOW
CREAT SERPL-MCNC: 1.1 MG/DL (ref 0.5–1)
EOSINOPHILS ABSOLUTE: 0.08 K/UL (ref 0.05–0.5)
EOSINOPHILS RELATIVE PERCENT: 2 % (ref 0–6)
GFR, ESTIMATED: 57 ML/MIN/1.73M2
GLUCOSE BLD-MCNC: 99 MG/DL (ref 74–99)
GLUCOSE URINE: NEGATIVE MG/DL
HBA1C MFR BLD: 5.4 % (ref 4–5.6)
HCT VFR BLD CALC: 40.3 % (ref 34–48)
HDLC SERPL-MCNC: 73 MG/DL
HEMOGLOBIN: 13.2 G/DL (ref 11.5–15.5)
IMMATURE GRANULOCYTES %: 0 % (ref 0–5)
IMMATURE GRANULOCYTES ABSOLUTE: <0.03 K/UL (ref 0–0.58)
KETONES, URINE: NEGATIVE MG/DL
LDL CHOLESTEROL: 121 MG/DL
LEUKOCYTE ESTERASE, URINE: ABNORMAL
LYMPHOCYTES ABSOLUTE: 1.09 K/UL (ref 1.5–4)
LYMPHOCYTES RELATIVE PERCENT: 28 % (ref 20–42)
MCH RBC QN AUTO: 29.4 PG (ref 26–35)
MCHC RBC AUTO-ENTMCNC: 32.8 G/DL (ref 32–34.5)
MCV RBC AUTO: 89.8 FL (ref 80–99.9)
MONOCYTES ABSOLUTE: 0.39 K/UL (ref 0.1–0.95)
MONOCYTES RELATIVE PERCENT: 10 % (ref 2–12)
NEUTROPHILS ABSOLUTE: 2.24 K/UL (ref 1.8–7.3)
NEUTROPHILS RELATIVE PERCENT: 58 % (ref 43–80)
NITRITE, URINE: NEGATIVE
PDW BLD-RTO: 13.1 % (ref 11.5–15)
PH, URINE: 6 (ref 5–9)
PLATELET # BLD: 285 K/UL (ref 130–450)
PMV BLD AUTO: 10.6 FL (ref 7–12)
POTASSIUM SERPL-SCNC: 4.1 MMOL/L (ref 3.5–5)
PROTEIN UA: NEGATIVE MG/DL
RBC # BLD: 4.49 M/UL (ref 3.5–5.5)
RBC UA: ABNORMAL /HPF
SODIUM BLD-SCNC: 142 MMOL/L (ref 132–146)
SPECIFIC GRAVITY UA: >1.03 (ref 1–1.03)
THYROXINE (T4): 7.4 UG/DL (ref 4.5–11.7)
TOTAL PROTEIN: 6.6 G/DL (ref 6.4–8.3)
TRIGL SERPL-MCNC: 71 MG/DL
TSH SERPL DL<=0.05 MIU/L-ACNC: 2.07 UIU/ML (ref 0.27–4.2)
TURBIDITY: ABNORMAL
URINE HGB: NEGATIVE
UROBILINOGEN, URINE: 0.2 EU/DL (ref 0–1)
VITAMIN D 25-HYDROXY: 45.2 NG/ML (ref 30–100)
VLDLC SERPL CALC-MCNC: 14 MG/DL
WBC # BLD: 3.8 K/UL (ref 4.5–11.5)
WBC UA: ABNORMAL /HPF

## 2024-09-11 ENCOUNTER — OFFICE VISIT (OUTPATIENT)
Dept: PRIMARY CARE CLINIC | Age: 69
End: 2024-09-11

## 2024-09-11 VITALS
TEMPERATURE: 97.8 F | BODY MASS INDEX: 27.42 KG/M2 | DIASTOLIC BLOOD PRESSURE: 82 MMHG | WEIGHT: 140.4 LBS | HEART RATE: 75 BPM | SYSTOLIC BLOOD PRESSURE: 128 MMHG | OXYGEN SATURATION: 98 % | RESPIRATION RATE: 17 BRPM

## 2024-09-11 DIAGNOSIS — F41.9 ANXIETY: Chronic | ICD-10-CM

## 2024-09-11 DIAGNOSIS — M81.0 AGE-RELATED OSTEOPOROSIS WITHOUT CURRENT PATHOLOGICAL FRACTURE: ICD-10-CM

## 2024-09-11 DIAGNOSIS — I10 ESSENTIAL HYPERTENSION: Chronic | ICD-10-CM

## 2024-09-11 DIAGNOSIS — M15.9 PRIMARY OSTEOARTHRITIS INVOLVING MULTIPLE JOINTS: Chronic | ICD-10-CM

## 2024-09-11 DIAGNOSIS — E78.2 MIXED HYPERLIPIDEMIA: Primary | ICD-10-CM

## 2024-09-11 RX ORDER — ROSUVASTATIN CALCIUM 20 MG/1
20 TABLET, COATED ORAL DAILY
Qty: 90 TABLET | Refills: 5 | Status: SHIPPED | OUTPATIENT
Start: 2024-09-11

## 2024-12-06 DIAGNOSIS — F41.9 ANXIETY: ICD-10-CM

## 2024-12-06 RX ORDER — ESCITALOPRAM OXALATE 10 MG/1
10 TABLET ORAL DAILY
Qty: 90 TABLET | Refills: 3 | Status: SHIPPED | OUTPATIENT
Start: 2024-12-06

## 2024-12-06 NOTE — TELEPHONE ENCOUNTER
Name of Medication(s) Requested:  Requested Prescriptions     Pending Prescriptions Disp Refills    escitalopram (LEXAPRO) 10 MG tablet 90 tablet 3     Sig: Take 1 tablet by mouth daily For anxiety       Medication is on current medication list Yes    Dosage and directions were verified? Yes    Quantity verified: 90 day supply     Pharmacy Verified?  Yes    Last Appointment:  9/11/2024    Future appts:  Future Appointments   Date Time Provider Department Center   3/12/2025 10:00 AM Sebastián Owens DO [facility] DEP        (If no appt send self scheduling link. .REFILLAPPT)  Scheduling request sent?     [] Yes  [x] No    Does patient need updated?  [] Yes  [x] No

## 2025-02-06 DIAGNOSIS — F41.9 ANXIETY: ICD-10-CM

## 2025-02-06 RX ORDER — BUPROPION HYDROCHLORIDE 300 MG/1
TABLET ORAL
Qty: 90 TABLET | Refills: 3 | Status: SHIPPED | OUTPATIENT
Start: 2025-02-06

## 2025-02-15 DIAGNOSIS — I10 ESSENTIAL HYPERTENSION: ICD-10-CM

## 2025-02-15 RX ORDER — TRIAMTERENE AND HYDROCHLOROTHIAZIDE 37.5; 25 MG/1; MG/1
1 TABLET ORAL DAILY
Qty: 90 TABLET | Refills: 3 | Status: SHIPPED | OUTPATIENT
Start: 2025-02-15

## 2025-03-05 DIAGNOSIS — M81.0 AGE-RELATED OSTEOPOROSIS WITHOUT CURRENT PATHOLOGICAL FRACTURE: ICD-10-CM

## 2025-03-05 DIAGNOSIS — I10 ESSENTIAL HYPERTENSION: Chronic | ICD-10-CM

## 2025-03-05 DIAGNOSIS — E78.2 MIXED HYPERLIPIDEMIA: ICD-10-CM

## 2025-03-05 LAB
ALBUMIN: 4.3 G/DL (ref 3.5–5.2)
ALP BLD-CCNC: 98 U/L (ref 35–104)
ALT SERPL-CCNC: 21 U/L (ref 0–32)
ANION GAP SERPL CALCULATED.3IONS-SCNC: 14 MMOL/L (ref 7–16)
AST SERPL-CCNC: 20 U/L (ref 0–31)
BACTERIA: ABNORMAL
BASOPHILS ABSOLUTE: 0.04 K/UL (ref 0–0.2)
BASOPHILS RELATIVE PERCENT: 1 % (ref 0–2)
BILIRUB SERPL-MCNC: 0.6 MG/DL (ref 0–1.2)
BILIRUBIN, URINE: NEGATIVE
BUN BLDV-MCNC: 17 MG/DL (ref 6–23)
CALCIUM SERPL-MCNC: 9 MG/DL (ref 8.6–10.2)
CHLORIDE BLD-SCNC: 105 MMOL/L (ref 98–107)
CHOLESTEROL, TOTAL: 142 MG/DL
CO2: 25 MMOL/L (ref 22–29)
COLOR, UA: YELLOW
CREAT SERPL-MCNC: 1.1 MG/DL (ref 0.5–1)
EOSINOPHILS ABSOLUTE: 0.13 K/UL (ref 0.05–0.5)
EOSINOPHILS RELATIVE PERCENT: 3 % (ref 0–6)
EPITHELIAL CELLS, UA: ABNORMAL /HPF
GFR, ESTIMATED: 57 ML/MIN/1.73M2
GLUCOSE BLD-MCNC: 104 MG/DL (ref 74–99)
GLUCOSE URINE: NEGATIVE MG/DL
HCT VFR BLD CALC: 39.8 % (ref 34–48)
HDLC SERPL-MCNC: 66 MG/DL
HEMOGLOBIN: 13.1 G/DL (ref 11.5–15.5)
IMMATURE GRANULOCYTES %: 0 % (ref 0–5)
IMMATURE GRANULOCYTES ABSOLUTE: <0.03 K/UL (ref 0–0.58)
KETONES, URINE: NEGATIVE MG/DL
LDL CHOLESTEROL: 62 MG/DL
LEUKOCYTE ESTERASE, URINE: ABNORMAL
LYMPHOCYTES ABSOLUTE: 1.28 K/UL (ref 1.5–4)
LYMPHOCYTES RELATIVE PERCENT: 27 % (ref 20–42)
MCH RBC QN AUTO: 29.6 PG (ref 26–35)
MCHC RBC AUTO-ENTMCNC: 32.9 G/DL (ref 32–34.5)
MCV RBC AUTO: 90 FL (ref 80–99.9)
MONOCYTES ABSOLUTE: 0.41 K/UL (ref 0.1–0.95)
MONOCYTES RELATIVE PERCENT: 9 % (ref 2–12)
NEUTROPHILS ABSOLUTE: 2.91 K/UL (ref 1.8–7.3)
NEUTROPHILS RELATIVE PERCENT: 61 % (ref 43–80)
NITRITE, URINE: NEGATIVE
PDW BLD-RTO: 13.2 % (ref 11.5–15)
PH, URINE: 6 (ref 5–8)
PLATELET # BLD: 284 K/UL (ref 130–450)
PMV BLD AUTO: 10.7 FL (ref 7–12)
POTASSIUM SERPL-SCNC: 3.9 MMOL/L (ref 3.5–5)
PROTEIN UA: 30 MG/DL
RBC # BLD: 4.42 M/UL (ref 3.5–5.5)
RBC UA: ABNORMAL /HPF
SODIUM BLD-SCNC: 144 MMOL/L (ref 132–146)
SPECIFIC GRAVITY UA: >1.03 (ref 1–1.03)
TOTAL PROTEIN: 6.7 G/DL (ref 6.4–8.3)
TRIGL SERPL-MCNC: 71 MG/DL
TURBIDITY: CLEAR
URINE HGB: NEGATIVE
UROBILINOGEN, URINE: 1 EU/DL (ref 0–1)
VITAMIN D 25-HYDROXY: 44.2 NG/ML (ref 30–100)
VLDLC SERPL CALC-MCNC: 14 MG/DL
WBC # BLD: 4.8 K/UL (ref 4.5–11.5)
WBC UA: ABNORMAL /HPF

## 2025-03-12 ENCOUNTER — OFFICE VISIT (OUTPATIENT)
Dept: PRIMARY CARE CLINIC | Age: 70
End: 2025-03-12

## 2025-03-12 VITALS
RESPIRATION RATE: 16 BRPM | OXYGEN SATURATION: 98 % | BODY MASS INDEX: 28.71 KG/M2 | WEIGHT: 147 LBS | TEMPERATURE: 98.5 F | HEART RATE: 85 BPM

## 2025-03-12 DIAGNOSIS — E78.2 MIXED HYPERLIPIDEMIA: Primary | Chronic | ICD-10-CM

## 2025-03-12 DIAGNOSIS — M15.0 PRIMARY OSTEOARTHRITIS INVOLVING MULTIPLE JOINTS: Chronic | ICD-10-CM

## 2025-03-12 DIAGNOSIS — E55.9 VITAMIN D DEFICIENCY: ICD-10-CM

## 2025-03-12 DIAGNOSIS — F41.9 ANXIETY: Chronic | ICD-10-CM

## 2025-03-12 DIAGNOSIS — E03.9 ACQUIRED HYPOTHYROIDISM: ICD-10-CM

## 2025-03-12 DIAGNOSIS — N18.31 STAGE 3A CHRONIC KIDNEY DISEASE (HCC): ICD-10-CM

## 2025-03-12 DIAGNOSIS — E53.8 VITAMIN B 12 DEFICIENCY: ICD-10-CM

## 2025-03-12 DIAGNOSIS — R73.03 PRE-DIABETES: ICD-10-CM

## 2025-03-12 DIAGNOSIS — R79.89 ELEVATED LIVER FUNCTION TESTS: ICD-10-CM

## 2025-03-12 PROBLEM — E87.6 HYPOKALEMIA: Status: RESOLVED | Noted: 2024-05-29 | Resolved: 2025-03-12

## 2025-03-12 SDOH — ECONOMIC STABILITY: FOOD INSECURITY: WITHIN THE PAST 12 MONTHS, THE FOOD YOU BOUGHT JUST DIDN'T LAST AND YOU DIDN'T HAVE MONEY TO GET MORE.: NEVER TRUE

## 2025-03-12 SDOH — ECONOMIC STABILITY: FOOD INSECURITY: WITHIN THE PAST 12 MONTHS, YOU WORRIED THAT YOUR FOOD WOULD RUN OUT BEFORE YOU GOT MONEY TO BUY MORE.: NEVER TRUE

## 2025-03-12 ASSESSMENT — PATIENT HEALTH QUESTIONNAIRE - PHQ9
SUM OF ALL RESPONSES TO PHQ QUESTIONS 1-9: 0
2. FEELING DOWN, DEPRESSED OR HOPELESS: NOT AT ALL
1. LITTLE INTEREST OR PLEASURE IN DOING THINGS: NOT AT ALL
SUM OF ALL RESPONSES TO PHQ QUESTIONS 1-9: 0

## 2025-03-12 NOTE — PROGRESS NOTES
Minerva Mahajan (:  1955) is a 69 y.o. female, Established patient, here for evaluation of the following chief complaint(s):  Follow-up (6m) and Discuss Labs            Subjective   History of Present Illness  The patient is a 69-year-old female who presents for evaluation of cholesterol, anxiety, creatinine, and weight management.    She reports an increase in her weight, which she attributes to stress-induced overeating. She expresses a desire to discontinue Lexapro due to its perceived contribution to her weight gain. She maintains a diet that is balanced between home-cooked meals and dining out. She plans to resume the Mediterranean diet, which she believes will improve her overall health, including her kidney function. She also intends to increase her physical activity through walking.    She continues to take bupropion and triamterene. She has recently transitioned from pravastatin to rosuvastatin for cholesterol management. She reports no current pain from kidney stones and no swelling in her ankles.    SOCIAL HISTORY  She does not drink alcohol.    MEDICATIONS  Current: Lexapro, bupropion, triamterene, rosuvastatin  Discontinued: pravastatin    Review of Systems:  Constitutional:  No fever, no fatigue, no chills, no headaches, no weight change  Dermatology:  No rash, no mole, no dry or sensitive skin  ENT:  No cough, no sore throat, no sinus pain, no runny nose, no ear pain  Cardiology:  No chest pain, no palpitations, no leg edema, no shortness of breath, no PND  Gastroenterology:  No dysphagia, no abdominal pain, no nausea, no vomiting, no constipation, no diarrhea, no heartburn  Musculoskeletal:  No joint pain, no leg cramps, no back pain, no muscle aches  Respiratory:  No shortness of breath, no orthopnea, no wheezing, no TAM, no hemoptysis  Urology:  No blood in the urine, no urinary frequency, no urinary incontinence, no urinary urgency, no nocturia, no dysuria         Objective   Vitals:

## 2025-06-08 NOTE — PROGRESS NOTES
Patient Seen in: [facility] In [place]        History  Chief Complaint   Patient presents with    Swelling Edema     Stated Complaint: c/o r hand swelling for about 1 month    Subjective:   HPI            42-year-old female presents for evaluation of right hand pain for about the past month.  Patient feels a pain on the palmar surface of her right index finger which radiates to the palm.  Worse when trying to flex the digit.  No recent trauma to the area.  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  She has had a history of tenosynovitis to be right thumb which required an injection in the past      Objective:     Past Medical History:    Ectopic pregnancy (HCC)    History of loop electrical excision procedure (LEEP)    2014    History of multiple miscarriages    3              Past Surgical History:   Procedure Laterality Date    D & c  2003    Miscarried    Leep                  Social History     Socioeconomic History    Marital status: Legally    Tobacco Use    Smoking status: Never    Smokeless tobacco: Never   Vaping Use    Vaping status: Never Used   Substance and Sexual Activity    Alcohol use: Not Currently     Comment: Stopped Social Drinking 10/6/2022    Drug use: Never   Other Topics Concern     Service No    Blood Transfusions No    Caffeine Concern No    Occupational Exposure No    Hobby Hazards No    Sleep Concern No    Stress Concern No    Weight Concern No    Special Diet No    Back Care No    Exercise No    Bike Helmet No    Seat Belt No    Self-Exams No     Social Drivers of Health     Food Insecurity: Food Insecurity Present (10/9/2024)    Received from [facility]    Hunger Vital Sign     Worried About Running Out of Food in the Last Year: Often true     Ran Out of Food in the Last Year: Sometimes true   Transportation Needs: No Transportation Needs (10/9/2024)    Received from [facility]    PRAPARE - Transportation     Lack of Transportation (Medical): No      Chief Complaint:   Diarrhea (Since last Thursday)      History of Present Illness   HPI:  Shanon Oneil is a 78 y.o. female who presents to walk-in today for nausea x 1 day and diarrhea and abdominal cramping since then. Denies any fevers, chills, chest pain, or shortness breath. She had tried taking Pepto to help with symptoms. She does admit that she is having improvement in her bowels. She denies any blood in her stool. Prior to Visit Medications    Medication Sig Taking? Authorizing Provider   pravastatin (PRAVACHOL) 40 MG tablet Take 1 tablet by mouth daily No food  Sebastián Owens DO   triamterene-hydroCHLOROthiazide (MAXZIDE-25) 37.5-25 MG per tablet Take 1 tablet by mouth daily  Sebastián Owens DO   buPROPion (WELLBUTRIN XL) 300 MG extended release tablet TAKE 1 TABLET BY MOUTH EVERY DAY IN THE MORNING  Sebastián Owens DO   vitamin D (ERGOCALCIFEROL) 1.25 MG (63185 UT) CAPS capsule One weekly for 12 weeks then vit d 3  2000 daily  Sebastián Owens DO   RESTASIS 0.05 % ophthalmic emulsion INSTILL 1 DROP INTO BOTH EYES EVERY 12 HOURS  Historical Provider, MD   acyclovir (ZOVIRAX) 5 % CREA   Historical Provider, MD       Review of Systems   Review of Systems   Constitutional:  Positive for appetite change and fatigue. Negative for fever. HENT:  Negative for congestion, postnasal drip and sore throat. Respiratory:  Negative for cough, chest tightness and shortness of breath. Cardiovascular:  Negative for chest pain and palpitations. Gastrointestinal:  Positive for diarrhea, nausea and vomiting. Negative for abdominal pain. Genitourinary:  Negative for dysuria, frequency, hematuria and urgency. Neurological:  Negative for dizziness and headaches. Patient's medical, social, and family history reviewed    Past Medical History:  has a past medical history of History of shingles, Hyperlipidemia, and Hypertension.    Past Surgical History:  has a past surgical history that includes knee Lack of Transportation (Non-Medical): No   Housing Stability: High Risk (10/9/2024)    Received from [facility]    Housing Stability Vital Sign     Unable to Pay for Housing in the Last Year: Yes     Number of Times Moved in the Last Year: 1     Homeless in the Last Year: No                                Physical Exam    ED Triage Vitals [06/08/25 0942]   /85   Pulse 93   Resp 16   Temp 97.5 °F (36.4 °C)   Temp src Temporal   SpO2 98 %   O2 Device None (Room air)       Current Vitals:   Vital Signs  BP: 138/85  Pulse: 93  Resp: 16  Temp: 97.5 °F (36.4 °C)  Temp src: Temporal    Oxygen Therapy  SpO2: 98 %  O2 Device: None (Room air)            Physical Exam     Right upper extremity: There is tenderness without swelling or erythema to the palmar surface of the index finger overlying the proximal interphalangeal joint.    There is some tenderness over the palm without any redness or lymphangitic streaking.    Strong radial pulse.  Sensation intact.      ED Course  Labs Reviewed - No data to display                         MDM     Differential diagnosis includes arthritis, inflammatory tenosynovitis, infectious tenosynovitis    I have independently visualized the radiology images, my focus/limited interpretation: No fracture or other bony lesion    Defer to radiologist for other/incidental findings    Finger splint applied.    Will try a Medrol Dosepak with Norco as needed for breakthrough pain and have the patient follow-up with hand specialist    Medical Decision Making      Disposition and Plan     Clinical Impression:  1. Tenosynovitis         Disposition:  There is no disposition on file for this visit.  There is no disposition time on file for this visit.    Follow-up:  Jad Joaquin MD  [facility] [place] [id number]  [phone]    Schedule an appointment as soon as possible for a visit            Medications Prescribed:  Current Discharge Medication List        START taking these medications     Details   !! methylPREDNISolone (MEDROL) 4 MG Oral Tablet Therapy Pack Dosepack: take as directed  Qty: 1 each, Refills: 0      HYDROcodone-acetaminophen 5-325 MG Oral Tab Take 1-2 tablets by mouth every 6 (six) hours as needed for Pain.  Qty: 10 tablet, Refills: 0    Associated Diagnoses: Tenosynovitis       !! - Potential duplicate medications found. Please discuss with provider.                Supplementary Documentation: